# Patient Record
Sex: FEMALE
[De-identification: names, ages, dates, MRNs, and addresses within clinical notes are randomized per-mention and may not be internally consistent; named-entity substitution may affect disease eponyms.]

---

## 2018-06-03 ENCOUNTER — HOSPITAL ENCOUNTER (INPATIENT)
Dept: HOSPITAL 42 - ED | Age: 18
LOS: 29 days | Discharge: HOME | DRG: 430 | End: 2018-07-02
Attending: PSYCHIATRY & NEUROLOGY | Admitting: PSYCHIATRY & NEUROLOGY
Payer: MEDICAID

## 2018-06-03 VITALS — BODY MASS INDEX: 34 KG/M2

## 2018-06-03 DIAGNOSIS — R45.851: ICD-10-CM

## 2018-06-03 DIAGNOSIS — F19.10: ICD-10-CM

## 2018-06-03 DIAGNOSIS — F12.90: ICD-10-CM

## 2018-06-03 DIAGNOSIS — E66.9: ICD-10-CM

## 2018-06-03 DIAGNOSIS — F41.9: ICD-10-CM

## 2018-06-03 DIAGNOSIS — F20.2: Primary | ICD-10-CM

## 2018-06-03 DIAGNOSIS — Z91.14: ICD-10-CM

## 2018-06-03 DIAGNOSIS — D72.829: ICD-10-CM

## 2018-06-03 LAB
ALBUMIN SERPL-MCNC: 4.9 G/DL (ref 3–4.8)
ALBUMIN/GLOB SERPL: 1.2 {RATIO} (ref 1.1–1.8)
ALT SERPL-CCNC: 63 U/L (ref 7–56)
APAP SERPL-MCNC: < 10 UG/ML (ref 10–20)
APPEARANCE UR: CLEAR
AST SERPL-CCNC: 37 U/L (ref 14–36)
BASOPHILS # BLD AUTO: 0.03 K/MM3 (ref 0–2)
BASOPHILS NFR BLD: 0.2 % (ref 0–3)
BILIRUB UR-MCNC: NEGATIVE MG/DL
BUN SERPL-MCNC: 7 MG/DL (ref 7–21)
CALCIUM SERPL-MCNC: 9.4 MG/DL (ref 8.4–10.5)
COLOR UR: YELLOW
EOSINOPHIL # BLD: 0 10*3/UL (ref 0–0.7)
EOSINOPHIL NFR BLD: 0.1 % (ref 1.5–5)
ERYTHROCYTE [DISTWIDTH] IN BLOOD BY AUTOMATED COUNT: 13.4 % (ref 11.5–14.5)
GFR NON-AFRICAN AMERICAN: > 60
GLUCOSE UR STRIP-MCNC: NEGATIVE MG/DL
GRANULOCYTES # BLD: 12.33 10*3/UL (ref 1.4–6.5)
GRANULOCYTES NFR BLD: 75.5 % (ref 50–68)
HGB BLD-MCNC: 13.8 G/DL (ref 12–16)
LEUKOCYTE ESTERASE UR-ACNC: NEGATIVE LEU/UL
LYMPHOCYTES # BLD: 2.7 10*3/UL (ref 1.2–3.4)
LYMPHOCYTES NFR BLD AUTO: 16.2 % (ref 22–35)
MCH RBC QN AUTO: 28.8 PG (ref 25–35)
MCHC RBC AUTO-ENTMCNC: 33.6 G/DL (ref 31–37)
MCV RBC AUTO: 85.6 FL (ref 80–105)
MONOCYTES # BLD AUTO: 1.3 10*3/UL (ref 0.1–0.6)
MONOCYTES NFR BLD: 8 % (ref 1–6)
PH UR STRIP: 5.5 [PH] (ref 4.7–8)
PLATELET # BLD: 290 10^3/UL (ref 120–450)
PMV BLD AUTO: 9.7 FL (ref 7–11)
PROT UR STRIP-MCNC: NEGATIVE MG/DL
RBC # BLD AUTO: 4.8 10^6/UL (ref 3.5–6.1)
RBC # UR STRIP: NEGATIVE /UL
SALICYLATES SERPL-MCNC: < 1 MG/DL (ref 2–20)
SP GR UR STRIP: >= 1.03 (ref 1–1.03)
UROBILINOGEN UR STRIP-ACNC: 0.2 E.U./DL
WBC # BLD AUTO: 16.3 10^3/UL (ref 4.5–11)

## 2018-06-04 LAB
HDLC SERPL-MCNC: 36 MG/DL (ref 29–60)
LDLC SERPL-MCNC: 42 MG/DL (ref 0–129)

## 2018-06-04 PROCEDURE — GZ3ZZZZ MEDICATION MANAGEMENT: ICD-10-PCS | Performed by: PSYCHIATRY & NEUROLOGY

## 2018-06-04 RX ADMIN — OLANZAPINE PRN MG: 5 TABLET, ORALLY DISINTEGRATING ORAL at 21:18

## 2018-06-04 RX ADMIN — OLANZAPINE PRN MG: 5 TABLET, ORALLY DISINTEGRATING ORAL at 11:44

## 2018-06-04 NOTE — RAD
HISTORY:

depression, SI  



COMPARISON:

No prior. 



FINDINGS:



LUNGS:

No active pulmonary disease.



PLEURA:

No significant pleural effusion identified, no pneumothorax apparent.



CARDIOVASCULAR:

Normal.



OSSEOUS STRUCTURES:

No significant abnormalities.



VISUALIZED UPPER ABDOMEN:

Normal.



OTHER FINDINGS:

None.



IMPRESSION:

No active disease.

## 2018-06-04 NOTE — PCM.PSYCH
Initial Psychiatric Evaluation





- Initial Psychiatric Evaluation


Type of Admission: Voluntary


Legal Status: Capacity (pt has a capacity to sign consent for treatment)


Chief Complaint (in patient's own words): 





"what, ha-ha, ah, hm, I was running around, was fighting with myself, those 

voices told me just chill, I'm not going to talk about that." pt presented to 

be completely disorganized, psychotic, very hard to interview due to thought 

blocking and disorganized statements. 


Patient's Reaction to Hospitalization: 





pt was admitted for evaluation and stabilization of disorganized thoughts and 

behavior, possible suicidal ideations


History of Present Illness and Precipitating Events: 


this writer is familiar with this pt from the previous [place] [facility] [place] in [place], back then pt was admitted under the name of 

Reyes, Ashley. This time pt was admitted as Haydee Aguilar, pt said her true 

name is Reyes Ashley. SW will contact family, will request official ID for the 

record. 





Shortly pt is 18yo  Female with reported history of psychosis, most 

likely schizophrenia, pt has one psych admission to [facility] last 

year, pt was initially admitted to ICU, confused and agitated, pt was having 

command type hallucinations back then, was [place] the train [place], "doing 

exorcism", had impression that her father was not her biological father, pt 

staid here for almost a month, after the d/c  pt was noncompliant with 

medications and follow up appts, pt was using drugs, as a result pt was confused

, was wondering on streets, was psychotic, was not able to function. Pt 

required further evaluation and stabilization, meds resumption and titration.  





pt was seen and examined, discussed with staff and previous record was 

reviewed. 


this writer is very familiar to this pt from the prolong hospitalization at [facility] [place] 

[place]. Back then pt was very disorganized, was admitted to ICU 

under Lauren Arauz because pt was very confused and was not able to provide any 

history, family meeting took place back then because pt thought that her real 

biological father was substituted by the stranger, pt also was doing "exorcism" 

while being in the train station as well as was receiving messages through 

poles. (pleas see previous notes for the more detailed info). 





As per staff pt is disorganized, catatonic, responding to internal stimuli, pt 

was trying to walk absolutely naked in the unit, was not able to understand why 

such behavior is inappropriate, pt was redirected immediately, pt also was 

observed talking and laughing to herself, pt also was talking to the objects. 





During the treatment team meeting pt presented to be disorganized, was keep 

asking "wha?", then giggle inappropriately, pt then said that she hears some 

voices, telling her "to chill", and she came to the hospital to "chill", and 

her plans for the future "to chill", pt said she does not want to talk about 

her family, pt said she was noncompliant with meds for unknown reason. 





pt denied using drugs, but UDS was positive for multiple substances. 





pt denied smoking, but it is ?.





pt is very disorganized, severe thought blocking, responding to internal 

stimuli. 





on the question of Suicidal ideation pt asked "huh?", then giggled, on 

homicidal ideation  "huh?" then giggled. 





previous admission pt presented the same way, confused with catatonia. 





Past psych h/o: from the previous record pt's mother passed away in 2005, since 

that time patient was feeling depressed, one previous psych admission 2017 

September. At this admission  on call recommended [facility] screening, but 

pt signed consent for treatment. 





Family history: Patient denied





Medical history: Patient denied, but patient seems to be overweight, WBC 

elevated, pt also is confused. 














 06/03/18 20:21 





 06/03/18 20:21 





 Lab Results





06/04/18 07:00: TSH 3rd Generation 1.26


06/04/18 07:00: Triglycerides 59, Cholesterol 95 L, LDL Cholesterol Direct 42, 

HDL Cholesterol 36


06/03/18 20:21: Alcohol, Quantitative < 10


06/03/18 20:21: Salicylates < 1 L, Acetaminophen < 10.0 L


06/03/18 20:21: Sodium 142, Potassium 3.5 L, Chloride 103, Carbon Dioxide 23, 

Anion Gap 19, BUN 7, Creatinine 0.5 L, Est GFR ( Amer) > 60, Est GFR (Non

-Af Amer) > 60, Random Glucose 103, Calcium 9.4, Magnesium 1.8, Total Bilirubin 

1.6 H, AST 37 H, ALT 63 H, Alkaline Phosphatase 97, Total Protein 9.0 H, 

Albumin 4.9 H, Globulin 4.1, Albumin/Globulin Ratio 1.2


06/03/18 20:21: WBC 16.3 H, RBC 4.80, Hgb 13.8, Hct 41.1, MCV 85.6, MCH 28.8, 

MCHC 33.6, RDW 13.4, Plt Count 290, MPV 9.7, Gran % 75.5 H, Lymph % (Auto) 16.2 

L, Mono % (Auto) 8.0 H, Eos % (Auto) 0.1 L, Baso % (Auto) 0.2, Gran # 12.33 H, 

Lymph # (Auto) 2.7, Mono # (Auto) 1.3 H, Eos # (Auto) 0.0, Baso # (Auto) 0.03


06/03/18 20:02: Urine Opiates Screen Positive H, Urine Methadone Screen Negative

, Ur Barbiturates Screen Negative, Ur Phencyclidine Scrn Negative, Ur 

Amphetamines Screen Negative, U Benzodiazepines Scrn Positive H, U Oth Cocaine 

Metabols Positive H, U Cannabinoids Screen Negative


06/03/18 20:02: Urine Color Yellow, Urine Appearance Clear, Urine pH 5.5, Ur 

Specific Gravity >= 1.030, Urine Protein Negative, Urine Glucose (UA) Negative, 

Urine Ketones Trace H, Urine Blood Negative, Urine Nitrate Negative, Urine 

Bilirubin Negative, Urine Urobilinogen 0.2, Ur Leukocyte Esterase Negative











Vital Signs











  Temp Pulse Resp BP Pulse Ox


 


 06/04/18 07:33  98.5 F  84  20  133/79 


 


 06/04/18 05:45    18  


 


 06/04/18 04:03  98.2 F  87  20  133/72  98


 


 06/04/18 03:24  98.2 F  87  20  133/72  98


 


 06/04/18 01:30  99.2 F  94 H  20  146/77  99


 


 06/03/18 19:44  98.4 F  80  18  107/92 H  98











Current Medications: 





Active Medications











Generic Name Dose Route Start Last Admin





  Trade Name Freq  PRN Reason Stop Dose Admin


 


Acetaminophen  650 mg  06/04/18 04:18  





  Tylenol 325mg Tab  PO   





  Q4H PRN   





  Pain, Mild (1-3)   


 


Al Hydrox/Mg Hydrox/Simethicone  30 ml  06/04/18 04:18  





  Maalox Plus 30 Ml  PO   





  DAILY PRN   





  Upset Stomach   


 


Magnesium Hydroxide  30 ml  06/04/18 04:18  





  Milk Of Magnesia  PO   





  DAILY PRN   





  Constipation   








pt was willing to be resumed on seroquel 


as per previous record pt was confused on risperdal and on haldol was not 

improving





Past Psychiatric History





- Past Psychiatric History


Previous Treatment History: Inpatient


Prior Professional Help: see HPI


Prior Psychiatric Treatment: see HPI


At what hospital: see HPI


Duration: see HPI


Nature of Treatment: see HPI


Explanation of prior treatment: 





see HPI


History of Abuse: 





see HPI


pt denied





History of ETOH/Drug Use: 





see HPI


History of Family Illness: 





see HPI


Pertinent Medical Hx (Current Medical&Sleep Prob, Allergies): 





 Allergies











Allergy/AdvReac Type Severity Reaction Status Date / Time


 


No Known Allergies Allergy   Unverified 06/04/18 05:42








 





Unobtainable  06/03/18 











Review of Systems





- Review of Systems


Systems not reviewed;Unavailable: Acuity of Condition





- EENT


Eyes: As Per HPI


Ears: As Per HPI


Nose/Mouth/Throat: As Per HPI





- Breasts


Breasts: As Per HPI





- Cardiovascular


Cardiovascular: As Per HPI





- Respiratory


Respiratory: As Per HPI





- Gastrointestinal


Gastrointestinal: As Per HPI





- Genitourinary


Genitourinary: As Per HPI





- Reproductive: Female


Reproductive:Female: As Per HPI





- Menstruation


Menstruation: As Per HPI





- Musculoskeletal


Musculoskeletal: As Par HPI





- Integumentary


Integumentary: As Per HPI





- Neurological


Neurological: As Per HPI





- Psychiatric


Psychiatric: As Per HPI





- Endocrine


Endocrine: As Per HPI





- Hematologic/Lymphatic


Hematologic: As Per HPI





Mental Status Examination





- Personal Presentation


Personal Presentation: Looks stated age





- Affect


Affect: Other (pt was giggling inappropriately, )





- Motor Activity


Motor Activity: Psychomotor Retardation





- Reliability in Providing Information


Reliability in Providing Information: Poor, due to alteration in thoughts, Poor

, due to altered mood, Poor, due to cognitve impairment





- Speech


Speech: Disorganized, Irrelevant, Incoherent





- Mood


Mood: Other ("Hah?")





- Formal Thought Process


Formal Thought Process: Hallucinations, Delusions, Paranoia, Loosening of 

associations, Circumstantial





- Hallucinations/Delusions


Hallucinations: Visual, Auditory


Delusions: Persecution





- Obsessions/Compulsions


Obsessions: None


Compulsions: None





- Cognitive Functions


Orientation: Person


Sensorium: Drowsy


Attention/Concentration: Easily distracted


Abstract Thinking: Strawberry


Estimate of Intelligence: Below average


Judgement: Intact, as evidence by: Insight regarding need for hospitalization





- Risk


Risk: Diminished functioning, Other (disorganized thougths and behavior)





- Strength & Assets Inventory


Strength & Assets Inventory: Family support, Cooperative, Other (good physical 

health)





- Limitations


Limitations: Other (severe psychosis, noncompliance with medications and f/u 

appts)





DSM 5 DX





- DSM 5


DSM 5 Diagnosis: 





r/o Schizophrenia


schizophreniform


r/o substance induced psychosis


polysubstance abuse. 











- Recommended/Plan of Treatment


Treatment Recommendations and Plan of Treatment: 


Milieu/structure/supportive therapy 


Medical consult will be called for leukocytosis


seroquel 100mg po bid for psychosis


PRN meds zyprexa


will call for collaterals, family involvement


SW consultation for discharge plan and social issues 


Follow up on labs 


Will monitor closely 


Pt was educated about risk/benefits and alternatives of medications, coping 

strategies (safety plan, suicide prevention), relapse prevention, importance of 

follow up with psychiatrist and therapist, stay away from drugs/alcohol/smoking





Projected ELOS: 20days


Prognosis: guarded


Discharge Plan and Discharge Criteria: 


Pt will be not depressed or manic, will be more hopeful, will be not psychotic 

or anxious, will be not having thoughts of harming self or others, will be 

tolerating medications well, will not have major side effects, will be able to 

function, will not pose threat to self or others.








- Smoking Cessation


Smoking Cessation Initiated: No


Reason for not providing: pt said she is not smoking

## 2018-06-04 NOTE — ED PDOC
Physical Exam


Vital Signs











  Temp Pulse Resp BP Pulse Ox


 


 06/04/18 03:24  98.2 F  87  20  133/72  98


 


 06/04/18 01:30  99.2 F  94 H  20  146/77  99


 


 06/03/18 19:44  98.4 F  80  18  107/92 H  98














Medical Decision Making


ED Course and Treatment: 


06/03/18 23:00


Case endorsed to me by Dr. Mathur, pending [facility] screening and disposition.





06/04/18 03:24


Pt seen and evaluated by [facility] screeners, pt not candidate for involuntary 

admission. Pt re-evaluated by [facility] [place] Miko, who discussed case 

with psychiatrist on call. Pt will be admitted to [facility] for 

schizophrenia under Dr. Dsouza's service. 





- Lab Interpretations


Lab Results: 








 06/03/18 20:21 





 06/03/18 20:21 





 Lab Results





06/03/18 20:21: Alcohol, Quantitative < 10


06/03/18 20:21: Salicylates < 1 L, Acetaminophen < 10.0 L


06/03/18 20:21: Sodium 142, Potassium 3.5 L, Chloride 103, Carbon Dioxide 23, 

Anion Gap 19, BUN 7, Creatinine 0.5 L, Est GFR ( Amer) > 60, Est GFR (Non

-Af Amer) > 60, Random Glucose 103, Calcium 9.4, Magnesium 1.8, Total Bilirubin 

1.6 H, AST 37 H, ALT 63 H, Alkaline Phosphatase 97, Total Protein 9.0 H, 

Albumin 4.9 H, Globulin 4.1, Albumin/Globulin Ratio 1.2


06/03/18 20:21: WBC 16.3 H, RBC 4.80, Hgb 13.8, Hct 41.1, MCV 85.6, MCH 28.8, 

MCHC 33.6, RDW 13.4, Plt Count 290, MPV 9.7, Gran % 75.5 H, Lymph % (Auto) 16.2 

L, Mono % (Auto) 8.0 H, Eos % (Auto) 0.1 L, Baso % (Auto) 0.2, Gran # 12.33 H, 

Lymph # (Auto) 2.7, Mono # (Auto) 1.3 H, Eos # (Auto) 0.0, Baso # (Auto) 0.03


06/03/18 20:02: Urine Opiates Screen Positive H, Urine Methadone Screen Negative

, Ur Barbiturates Screen Negative, Ur Phencyclidine Scrn Negative, Ur 

Amphetamines Screen Negative, U Benzodiazepines Scrn Positive H, U Oth Cocaine 

Metabols Positive H, U Cannabinoids Screen Negative


06/03/18 20:02: Urine Color Yellow, Urine Appearance Clear, Urine pH 5.5, Ur 

Specific Gravity >= 1.030, Urine Protein Negative, Urine Glucose (UA) Negative, 

Urine Ketones Trace H, Urine Blood Negative, Urine Nitrate Negative, Urine 

Bilirubin Negative, Urine Urobilinogen 0.2, Ur Leukocyte Esterase Negative











- RAD Interpretation


Radiology Orders: 








06/03/18 19:47


CHEST PORTABLE [RAD] Stat 














Disposition/Present on Arrival





- Present on Arrival


Any Indicators Present on Arrival: No


History of DVT/PE: No


History of Uncontrolled Diabetes: No


Urinary Catheter: No


History of Decub. Ulcer: No


History Surgical Site Infection Following: None





- Disposition


Have Diagnosis and Disposition been Completed?: Yes


Diagnosis: 


 Suicidal ideation, Polysubstance abuse, Schizophrenia





Disposition: HOSPITALIZED


Disposition Time: 03:24


Patient Plan: Admission


Patient Problems: 


 Current Active Problems











Problem Status Onset


 


Polysubstance abuse Acute  


 


Schizophrenia Acute  


 


Suicidal ideation Acute  











Condition: STABLE


Print Language: ENGLISH


Referrals: 


PCP,NO [Primary Care Provider] - Follow up with primary


Forms:  [employer] (English)

## 2018-06-04 NOTE — PCM.BM
<Jesús Romano - Last Filed: 06/04/18 06:40>





Treatment Plan Problems





- Problems identified on initial assessmt


  ** Altered thoughts


Date Initiated: 06/04/18


Time Initiated: 06:40


Assessment reference: NA


Status: Active





  ** Substance abuse


Date Initiated: 06/04/18


Time Initiated: 06:41


Assessment reference: NA


Status: Active





Treatment assets and liabiliti


Patient Assests: ADL independent, good support system, good past tx response, 

good interpersonal skills


Patient Liabilities: substance abuse





- Milieu Protocol


Maintain good personal hygiene: daily Encourage regular showers, daily Remind 

patient to perform daily oral care, daily Assist patient to perform ADL's


Maintain personal safety: daily Educate patient to report safety concerns to 

staff, daily Monitor environment for contraband/sharps


Medication safety: Monitor for expected outcome, potential side effects: daily, 

Assess barriers to learning: daily, Assess readiness for medication education: 

daily





Family Contact


Family involvement: Family/SO is involved


Family contact: Patient agrees to contact


Family contact name: Kwabena [phone]





- Goals for Treatment


Patient goals for treatment: To get better





Discharge/Continuing Care





- Education Needs


Education Needs: Patient Medication, Patient Coping Skills, Patient Aftercare 

Safety Plan





- Discharge


Discharge Criteria: Free of paranoid thoughts, Normal sleep pattern





<Hattie Dsouza - Last Filed: 06/04/18 09:14>





- Diagnosis


(1) Polysubstance abuse


Status: Acute   


Interventions: 





06/04/18 09:14


Monitoring withdrawal symptoms


Medical detoxification


Pharmacotherapy for alcohol/benzos/opioid dependence 


Maintaining sobriety


Relapse prevention


Possible rehabilitation


Motivational interviewing


12-step programs: AA meetings








(2) Schizophrenia


Status: Acute   


Interventions: 





06/04/18 09:15


Psychoeducation/psychotherapy


Psychopharmacology/adjustment of medications as needed/ monitoring possible 

side effects


Evaluate pt on daily basis


Compliance with medications and follow up appointments


Long acting medication if pt is noncompliant with pill form


Suicide and homicide risk assessment and prevention, coping strategies, safety 

plan


Relapse prevention


Reduction of symptoms


Improve functional status


Possible assertive community treatment


Cognitive behavioral therapy


Family involvement


Possible social skill training as outpatient








<Yordy Blount - Last Filed: 06/04/18 09:48>





Treatment Plan Problems





- Problems identified on initial assessmt


  ** Altered thoughts


Priority: 2





  ** Auditory Hallucinations


Date Initiated: 06/04/18


Time Initiated: 09:45


Assessment reference: NA


Status: Active


Priority: 1





  ** Impaired Communication


Date Initiated: 06/04/18


Time Initiated: 09:45


Assessment reference: NA


Status: Active


Priority: 3





  ** Depressive Symptoms


Date Initiated: 06/04/18


Time Initiated: 09:46


Assessment reference: NA


Status: Active


Priority: 4





- Milieu Protocol


Maintain personal safety: every shift Educate patient to report safety concerns 

to staff, every shift Monitor environment for contraband/sharps


Medication safety: Monitor for expected outcome, potential side effects: every 

shift, Assess barriers to learning: every shift, Assess readiness for 

medication education: every shift





<Kay Wallace Y - Last Filed: 06/06/18 14:34>





Family Contact


Family involvement: Family/SO is involved


Family contact: Patient agrees to contact


Family contact name: Opal Brown([phone]) sister


Family contacted how many times per week?: 2

## 2018-06-04 NOTE — CARD
--------------- APPROVED REPORT --------------





EKG Measurement

Heart [id number]

AL [id number]

[id number]

[id number]

[id number]



<Conclusion>

Sinus rhythm with sinus arrhythmia with short AL

Otherwise normal ECG

## 2018-06-05 LAB
ALBUMIN SERPL-MCNC: 4.7 G/DL (ref 3–4.8)
ALBUMIN/GLOB SERPL: 1.2 {RATIO} (ref 1.1–1.8)
ALT SERPL-CCNC: 91 U/L (ref 7–56)
AST SERPL-CCNC: 65 U/L (ref 14–36)
BASOPHILS # BLD AUTO: 0.03 K/MM3 (ref 0–2)
BASOPHILS NFR BLD: 0.3 % (ref 0–3)
BUN SERPL-MCNC: 10 MG/DL (ref 7–21)
CALCIUM SERPL-MCNC: 9.6 MG/DL (ref 8.4–10.5)
EOSINOPHIL # BLD: 0.2 10*3/UL (ref 0–0.7)
EOSINOPHIL NFR BLD: 1.4 % (ref 1.5–5)
ERYTHROCYTE [DISTWIDTH] IN BLOOD BY AUTOMATED COUNT: 13.5 % (ref 11.5–14.5)
GFR NON-AFRICAN AMERICAN: > 60
GRANULOCYTES # BLD: 7.09 10*3/UL (ref 1.4–6.5)
GRANULOCYTES NFR BLD: 59.6 % (ref 50–68)
HGB BLD-MCNC: 13.5 G/DL (ref 12–16)
LYMPHOCYTES # BLD: 3.8 10*3/UL (ref 1.2–3.4)
LYMPHOCYTES NFR BLD AUTO: 32.1 % (ref 22–35)
MCH RBC QN AUTO: 28.4 PG (ref 25–35)
MCHC RBC AUTO-ENTMCNC: 32.5 G/DL (ref 31–37)
MCV RBC AUTO: 87.2 FL (ref 80–105)
MONOCYTES # BLD AUTO: 0.8 10*3/UL (ref 0.1–0.6)
MONOCYTES NFR BLD: 6.6 % (ref 1–6)
PLATELET # BLD: 302 10^3/UL (ref 120–450)
PMV BLD AUTO: 9.8 FL (ref 7–11)
RBC # BLD AUTO: 4.76 10^6/UL (ref 3.5–6.1)
WBC # BLD AUTO: 11.9 10^3/UL (ref 4.5–11)

## 2018-06-05 RX ADMIN — OLANZAPINE PRN MG: 5 TABLET, ORALLY DISINTEGRATING ORAL at 09:20
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
## 2018-06-05 NOTE — PCM.PYCHPN
Psychiatric Progress Note





- Psychiatric Progress Note


Patient seen today, length of contact: 30min


Patient Chief Complaint: 





"wha?, ha-ha"


Problems Identified/Issues Discussed: 





Suicide/ homicide prevention, past psychiatric h/o, current psychiatric symptoms

, medical problems, risk/benefits and alternatives of medications, medications 

compliance, coping strategies, substance abuse h/o, relapse prevention, 

importance of follow up with psychiatrist and therapist, discharge plan. 


Medical Problems: 





see HPI


Diagnostic Results: 














 06/05/18 07:21 





 06/05/18 07:21 





 Lab Results





06/05/18 07:21: Sodium 146, Potassium 3.6, Chloride 106, Carbon Dioxide 23, 

Anion Gap 21 H, BUN 10, Creatinine 0.6 L, Est GFR ( Amer) > 60, Est GFR (

Non-Af Amer) > 60, Random Glucose 115 H, Calcium 9.6, Magnesium 1.9, Total 

Bilirubin 0.9, AST 65 H D, ALT 91 H, Alkaline Phosphatase 104, Total Protein 

8.5 H, Albumin 4.7, Globulin 3.8, Albumin/Globulin Ratio 1.2


06/05/18 07:21: WBC 11.9 H D, RBC 4.76, Hgb 13.5, Hct 41.5, MCV 87.2, MCH 28.4, 

MCHC 32.5, RDW 13.5, Plt Count 302, MPV 9.8, Gran % 59.6, Lymph % (Auto) 32.1, 

Mono % (Auto) 6.6 H, Eos % (Auto) 1.4 L, Baso % (Auto) 0.3, Gran # 7.09 H, 

Lymph # (Auto) 3.8 H, Mono # (Auto) 0.8 H, Eos # (Auto) 0.2, Baso # (Auto) 0.03


06/04/18 07:00: RPR Nonreactive


06/04/18 07:00: TSH 3rd Generation 1.26


06/04/18 07:00: Triglycerides 59, Cholesterol 95 L, LDL Cholesterol Direct 42, 

HDL Cholesterol 36


06/03/18 20:21: Alcohol, Quantitative < 10


06/03/18 20:21: Salicylates < 1 L, Acetaminophen < 10.0 L


06/03/18 20:21: Sodium 142, Potassium 3.5 L, Chloride 103, Carbon Dioxide 23, 

Anion Gap 19, BUN 7, Creatinine 0.5 L, Est GFR ( Amer) > 60, Est GFR (Non

-Af Amer) > 60, Random Glucose 103, Calcium 9.4, Magnesium 1.8, Total Bilirubin 

1.6 H, AST 37 H, ALT 63 H, Alkaline Phosphatase 97, Total Protein 9.0 H, 

Albumin 4.9 H, Globulin 4.1, Albumin/Globulin Ratio 1.2


06/03/18 20:21: WBC 16.3 H, RBC 4.80, Hgb 13.8, Hct 41.1, MCV 85.6, MCH 28.8, 

MCHC 33.6, RDW 13.4, Plt Count 290, MPV 9.7, Gran % 75.5 H, Lymph % (Auto) 16.2 

L, Mono % (Auto) 8.0 H, Eos % (Auto) 0.1 L, Baso % (Auto) 0.2, Gran # 12.33 H, 

Lymph # (Auto) 2.7, Mono # (Auto) 1.3 H, Eos # (Auto) 0.0, Baso # (Auto) 0.03


06/03/18 20:02: Urine Opiates Screen Positive H, Urine Methadone Screen Negative

, Ur Barbiturates Screen Negative, Ur Phencyclidine Scrn Negative, Ur 

Amphetamines Screen Negative, U Benzodiazepines Scrn Positive H, U Oth Cocaine 

Metabols Positive H, U Cannabinoids Screen Negative


06/03/18 20:02: Urine Color Yellow, Urine Appearance Clear, Urine pH 5.5, Ur 

Specific Gravity >= 1.030, Urine Protein Negative, Urine Glucose (UA) Negative, 

Urine Ketones Trace H, Urine Blood Negative, Urine Nitrate Negative, Urine 

Bilirubin Negative, Urine Urobilinogen 0.2, Ur Leukocyte Esterase Negative











Vital Signs











  Temp Pulse Resp BP Pulse Ox


 


 06/05/18 07:00  98.5 F  125 H  18  147/97 H  100


 


 06/04/18 15:00   89   126/72 


 


 06/04/18 07:33  98.5 F  84  20  133/79 


 


 06/04/18 05:45    18  


 


 06/04/18 04:03  98.2 F  87  20  133/72  98


 


 06/04/18 03:24  98.2 F  87  20  133/72  98


 


 06/04/18 01:30  99.2 F  94 H  20  146/77  99


 


 06/03/18 19:44  98.4 F  80  18  107/92 H  98











DSM 5 Symptoms Update: 


this writer is familiar with this pt from the previous admission to [facility] psych 

inpatient unit in September 2017, back then pt was admitted under the name of 

Reyes, Ashley. This time pt was admitted as Haydee Aguilar, pt said her true 

name is Reyes Ashley. SW will contact family, will request official ID for the 

record. 





Shortly pt is 16yo  Female with reported history of psychosis, most 

likely schizophrenia, pt has one psych admission to [facility] last 

year, pt was initially admitted to ICU, confused and agitated, pt was having 

command type hallucinations back then, [place] found [place] the [place], "doing 

exorcism", had impression that her father was not her biological father, pt 

staid here for almost a month, after the d/c  pt was noncompliant with 

medications and follow up appts, pt was using drugs, as a result pt was confused

, was wondering on streets, was psychotic, was not able to function. Pt 

required further evaluation and stabilization, meds resumption and titration.  





pt was seen and examined, discussed with staff and previous record was 

reviewed. 


as per RN report pt was disorganized, required IM of geodon and ativan 

yesterday night because pt was keep walking to other pt's rooms and 

antagonizing others. today at am pt was observed trying to walk naked, was 

redirected immediately. 





this writer attempted to speak to the pt, but pt was just giggling and was keep 

asking "Wha?". 





so far pt is compliant with meds, but no result yet. 





pt is disorganized, wondering, confused, was not able to locate her room. 





Impression:


schizophrenia


r/o substance induced psychosis. 





Medication Change: Yes (seroquel increased, PRN changed)


Medical Record Reviewed: Yes


Consults ordered or reviewed: 





medical consult appreciated








Mental Status Examination





- Cognitive Function


Orientation: Person


Memory: Impaired


Attention: Poor


Concentration: Poor


Association: Loose


Fund of Knowledge: Poor





- Mood


Mood: Neutral, Other (inapropriate)





- Affect


Affect: Broad, Other (pt was giggling inappropriately, )





- Formal Thought Process


Formal Thought Process: Hallucinations, Delusions, Paranoia, Loosening of 

associations, Circumstantial





- Suicidal Ideation


Suicidal Ideation: No





- Homicidal Ideation


Homicidal Ideation: No





Goal/Treatment Plan





- Goal/Treatment Plan


Need for Continued Stay: Remain at risks for inpatient hospitalization, Severe 

depression anxiety, Discharge may exacerbated symptoms, Severe functional 

impairment


Progress Toward Problem(s) and Goals/Treatment Plan: 


Milieu/structure/supportive therapy 


Medical consult will be called for leukocytosis


seroquel 200mg po bid for psychosis


PRN meds zyprexa, will be d/c


asper RN report pt was little better on geodon, will start PRN 


will call for collaterals, family involvement


SW consultation for discharge plan and social issues 


Follow up on labs 


Will monitor closely 


Pt was educated about risk/benefits and alternatives of medications, coping 

strategies (safety plan, suicide prevention), relapse prevention, importance of 

follow up with psychiatrist and therapist, stay away from drugs/alcohol/smoking





Estimated Date of D/C: 06/11/18

## 2018-06-06 NOTE — PCM.PYCHPN
Psychiatric Progress Note





- Psychiatric Progress Note


Patient seen today, length of contact: 30min


Patient Chief Complaint: 





"wha?, ha-ha"


Problems Identified/Issues Discussed: 





Suicide/ homicide prevention, past psychiatric h/o, current psychiatric symptoms

, medical problems, risk/benefits and alternatives of medications, medications 

compliance, coping strategies, substance abuse h/o, relapse prevention, 

importance of follow up with psychiatrist and therapist, discharge plan. 


Medical Problems: 





see HPI


Diagnostic Results: 














 06/05/18 07:21 





 06/05/18 07:21 





 Lab Results





06/05/18 07:21: Sodium 146, Potassium 3.6, Chloride 106, Carbon Dioxide 23, 

Anion Gap 21 H, BUN 10, Creatinine 0.6 L, Est GFR ( Amer) > 60, Est GFR (

Non-Af Amer) > 60, Random Glucose 115 H, Calcium 9.6, Magnesium 1.9, Total 

Bilirubin 0.9, AST 65 H D, ALT 91 H, Alkaline Phosphatase 104, Total Protein 

8.5 H, Albumin 4.7, Globulin 3.8, Albumin/Globulin Ratio 1.2


06/05/18 07:21: WBC 11.9 H D, RBC 4.76, Hgb 13.5, Hct 41.5, MCV 87.2, MCH 28.4, 

MCHC 32.5, RDW 13.5, Plt Count 302, MPV 9.8, Gran % 59.6, Lymph % (Auto) 32.1, 

Mono % (Auto) 6.6 H, Eos % (Auto) 1.4 L, Baso % (Auto) 0.3, Gran # 7.09 H, 

Lymph # (Auto) 3.8 H, Mono # (Auto) 0.8 H, Eos # (Auto) 0.2, Baso # (Auto) 0.03


06/04/18 07:00: RPR Nonreactive


06/04/18 07:00: TSH 3rd Generation 1.26


06/04/18 07:00: Triglycerides 59, Cholesterol 95 L, LDL Cholesterol Direct 42, 

HDL Cholesterol 36


06/03/18 20:21: Alcohol, Quantitative < 10


06/03/18 20:21: Salicylates < 1 L, Acetaminophen < 10.0 L


06/03/18 20:21: Sodium 142, Potassium 3.5 L, Chloride 103, Carbon Dioxide 23, 

Anion Gap 19, BUN 7, Creatinine 0.5 L, Est GFR ( Amer) > 60, Est GFR (Non

-Af Amer) > 60, Random Glucose 103, Calcium 9.4, Magnesium 1.8, Total Bilirubin 

1.6 H, AST 37 H, ALT 63 H, Alkaline Phosphatase 97, Total Protein 9.0 H, 

Albumin 4.9 H, Globulin 4.1, Albumin/Globulin Ratio 1.2


06/03/18 20:21: WBC 16.3 H, RBC 4.80, Hgb 13.8, Hct 41.1, MCV 85.6, MCH 28.8, 

MCHC 33.6, RDW 13.4, Plt Count 290, MPV 9.7, Gran % 75.5 H, Lymph % (Auto) 16.2 

L, Mono % (Auto) 8.0 H, Eos % (Auto) 0.1 L, Baso % (Auto) 0.2, Gran # 12.33 H, 

Lymph # (Auto) 2.7, Mono # (Auto) 1.3 H, Eos # (Auto) 0.0, Baso # (Auto) 0.03


06/03/18 20:02: Urine Opiates Screen Positive H, Urine Methadone Screen Negative

, Ur Barbiturates Screen Negative, Ur Phencyclidine Scrn Negative, Ur 

Amphetamines Screen Negative, U Benzodiazepines Scrn Positive H, U Oth Cocaine 

Metabols Positive H, U Cannabinoids Screen Negative


06/03/18 20:02: Urine Color Yellow, Urine Appearance Clear, Urine pH 5.5, Ur 

Specific Gravity >= 1.030, Urine Protein Negative, Urine Glucose (UA) Negative, 

Urine Ketones Trace H, Urine Blood Negative, Urine Nitrate Negative, Urine 

Bilirubin Negative, Urine Urobilinogen 0.2, Ur Leukocyte Esterase Negative











Vital Signs











  Temp Pulse Resp BP Pulse Ox


 


 06/05/18 07:00  98.5 F  125 H  18  147/97 H  100


 


 06/04/18 15:00   89   126/72 


 


 06/04/18 07:33  98.5 F  84  20  133/79 


 


 06/04/18 05:45    18  


 


 06/04/18 04:03  98.2 F  87  20  133/72  98


 


 06/04/18 03:24  98.2 F  87  20  133/72  98


 


 06/04/18 01:30  99.2 F  94 H  20  146/77  99


 


 06/03/18 19:44  98.4 F  80  18  107/92 H  98











DSM 5 Symptoms Update: 


this writer is familiar with this pt from the previous admission to [facility] [place] 

inpatient unit in September 2017, back then pt was admitted under the name of 

Reyes, Ashley, this admission was Haydee Aguilar, but name was corrected to pt'

s true name is Reyes Ashley. 





Shortly pt is 16yo  Female with reported history of psychosis, most 

likely schizophrenia, pt has one psych admission to [facility] last 

year, pt was initially admitted to ICU, confused and agitated, pt was having 

command type hallucinations back then, was found [place] the train station, "doing 

exorcism", had impression that her father was not her biological father, pt 

staid here for almost a month, after the d/c  pt was noncompliant with 

medications and follow up appts, pt was using drugs, as a result pt was confused

, was wondering on streets, was psychotic, was not able to function. Pt 

required further evaluation and stabilization, meds resumption and titration. 





as per pt's sister collateral info which was obtained by JAIRO (help appreciated)


after last admission here [facility], pt went to [facility] intake but was confused, was 

hospitalized to [facility] involuntary for at least a month, pt was then transferred 

to [facility] for about two months, fter patient was d/c

, patient followed up at [facility]. PT's sister 

reports pt stated she finished the program, but actually stopped going. Pt's 

sister reports after pt was d/c from the hospital, pt had returned to her 

baseline. Pt's sister reports last seeing patient on May 26, 2018 in which 

patient did not present with any symptoms. Pt's other sister Elidia, last 

spoke to patient on Sunday around 5pm in which pt sounded at baseline. PT's 

sister reports pt's father later received a phone call from [facility] regarding pt's 

hospitalization. Pt's sister believes pt was compliant with medications and had 

insight about the importance of taking medication. Pt's sister believes pt's 

psychosis is related to drugs. 





as per RN report pt was disorganized, try to walk topless, needs constant 

redirection. 


frequent PRN, as per RN pt was responding to geodon better, will change it for 

Geodon





this writer attempted to speak to the pt, but pt was just giggling and was keep 

asking "Wha?". 





pt is disorganized, wondering, confused, was not able to locate her room. 





Impression:


schizophrenia


r/o substance induced psychosis. 





Medication Change: Yes (seroquel decreaased, geodon started.)


Medical Record Reviewed: Yes


Consults ordered or reviewed: 





medical consult appreciated








Mental Status Examination





- Cognitive Function


Orientation: Person


Memory: Impaired


Attention: Poor


Concentration: Poor


Association: Loose


Fund of Knowledge: Poor





- Mood


Mood: Neutral, Other (inapropriate)





- Affect


Affect: Broad, Other (pt was giggling inappropriately, )





- Formal Thought Process


Formal Thought Process: Hallucinations, Delusions, Paranoia, Loosening of 

associations, Circumstantial





- Suicidal Ideation


Suicidal Ideation: No





- Homicidal Ideation


Homicidal Ideation: No





Goal/Treatment Plan





- Goal/Treatment Plan


Need for Continued Stay: Remain at risks for inpatient hospitalization, Severe 

depression anxiety, Discharge may exacerbated symptoms, Severe functional 

impairment


Progress Toward Problem(s) and Goals/Treatment Plan: 


Milieu/structure/supportive therapy 


Medical consult will be called for leukocytosis


seroquel 100mg po bid for psychosis


geodon 20mg po bid and hs for psychosis


[facility] RN report pt was little better on geodon, will start PRN 


collaterals from family appreciated


SW consultation for discharge plan and social issues 


Follow up on labs 


Will monitor closely 


Pt was educated about risk/benefits and alternatives of medications, coping 

strategies (safety plan, suicide prevention), relapse prevention, importance of 

follow up with psychiatrist and therapist, stay away from drugs/alcohol/smoking





Estimated Date of D/C: 06/29/18

## 2018-06-07 NOTE — PCM.PYCHPN
Psychiatric Progress Note





- Psychiatric Progress Note


Patient seen today, length of contact: 30min


Patient Chief Complaint: 





"I had my reasons"


Problems Identified/Issues Discussed: 





Suicide/ homicide prevention, past psychiatric h/o, current psychiatric symptoms

, medical problems, risk/benefits and alternatives of medications, medications 

compliance, coping strategies, substance abuse h/o, relapse prevention, 

importance of follow up with psychiatrist and therapist, discharge plan. 


Medical Problems: 





see HPI


Diagnostic Results: 














 06/05/18 07:21 





 06/05/18 07:21 





 Lab Results





06/05/18 07:21: Sodium 146, Potassium 3.6, Chloride 106, Carbon Dioxide 23, 

Anion Gap 21 H, BUN 10, Creatinine 0.6 L, Est GFR ( Amer) > 60, Est GFR (

Non-Af Amer) > 60, Random Glucose 115 H, Calcium 9.6, Magnesium 1.9, Total 

Bilirubin 0.9, AST 65 H D, ALT 91 H, Alkaline Phosphatase 104, Total Protein 

8.5 H, Albumin 4.7, Globulin 3.8, Albumin/Globulin Ratio 1.2


06/05/18 07:21: WBC 11.9 H D, RBC 4.76, Hgb 13.5, Hct 41.5, MCV 87.2, MCH 28.4, 

MCHC 32.5, RDW 13.5, Plt Count 302, MPV 9.8, Gran % 59.6, Lymph % (Auto) 32.1, 

Mono % (Auto) 6.6 H, Eos % (Auto) 1.4 L, Baso % (Auto) 0.3, Gran # 7.09 H, 

Lymph # (Auto) 3.8 H, Mono # (Auto) 0.8 H, Eos # (Auto) 0.2, Baso # (Auto) 0.03


06/04/18 07:00: RPR Nonreactive


06/04/18 07:00: TSH 3rd Generation 1.26


06/04/18 07:00: Triglycerides 59, Cholesterol 95 L, LDL Cholesterol Direct 42, 

HDL Cholesterol 36


06/03/18 20:21: Alcohol, Quantitative < 10


06/03/18 20:21: Salicylates < 1 L, Acetaminophen < 10.0 L


06/03/18 20:21: Sodium 142, Potassium 3.5 L, Chloride 103, Carbon Dioxide 23, 

Anion Gap 19, BUN 7, Creatinine 0.5 L, Est GFR ( Amer) > 60, Est GFR (Non

-Af Amer) > 60, Random Glucose 103, Calcium 9.4, Magnesium 1.8, Total Bilirubin 

1.6 H, AST 37 H, ALT 63 H, Alkaline Phosphatase 97, Total Protein 9.0 H, 

Albumin 4.9 H, Globulin 4.1, Albumin/Globulin Ratio 1.2


06/03/18 20:21: WBC 16.3 H, RBC 4.80, Hgb 13.8, Hct 41.1, MCV 85.6, MCH 28.8, 

MCHC 33.6, RDW 13.4, Plt Count 290, MPV 9.7, Gran % 75.5 H, Lymph % (Auto) 16.2 

L, Mono % (Auto) 8.0 H, Eos % (Auto) 0.1 L, Baso % (Auto) 0.2, Gran # 12.33 H, 

Lymph # (Auto) 2.7, Mono # (Auto) 1.3 H, Eos # (Auto) 0.0, Baso # (Auto) 0.03


06/03/18 20:02: Urine Opiates Screen Positive H, Urine Methadone Screen Negative

, Ur Barbiturates Screen Negative, Ur Phencyclidine Scrn Negative, Ur 

Amphetamines Screen Negative, U Benzodiazepines Scrn Positive H, U Oth Cocaine 

Metabols Positive H, U Cannabinoids Screen Negative


06/03/18 20:02: Urine Color Yellow, Urine Appearance Clear, Urine pH 5.5, Ur 

Specific Gravity >= 1.030, Urine Protein Negative, Urine Glucose (UA) Negative, 

Urine Ketones Trace H, Urine Blood Negative, Urine Nitrate Negative, Urine 

Bilirubin Negative, Urine Urobilinogen 0.2, Ur Leukocyte Esterase Negative











Vital Signs











  Temp Pulse Resp BP Pulse Ox


 


 06/05/18 07:00  98.5 F  125 H  18  147/97 H  100


 


 06/04/18 15:00   89   126/72 


 


 06/04/18 07:33  98.5 F  84  20  133/79 


 


 06/04/18 05:45    18  


 


 06/04/18 04:03  98.2 F  87  20  133/72  98


 


 06/04/18 03:24  98.2 F  87  20  133/72  98


 


 06/04/18 01:30  99.2 F  94 H  20  146/77  99


 


 06/03/18 19:44  98.4 F  80  18  107/92 H  98














 











Temp Pulse Resp BP Pulse Ox


 


 98.0 F   144 H  20   139/82   100 


 


 06/07/18 07:15  06/07/18 07:15  06/07/18 07:15  06/07/18 07:15  06/05/18 07:00








DSM 5 Symptoms Update: 


Shortly pt is 16yo  Female with reported history of psychosis, most 

likely schizophrenia, pt has one psych admission to [facility] last 

year, pt was initially admitted to ICU, confused and agitated, pt was having 

command type hallucinations back then, was found [place] the [place] station, "doing 

exorcism", had impression that her father was not her biological father, pt 

staid here for almost a month, after the d/c  pt was noncompliant with 

medications and follow up appts, pt was using drugs, as a result pt was confused

, was wondering on streets, was psychotic, was not able to function. Pt 

required further evaluation and stabilization, meds resumption and titration. 





pt presented in catatonic stage, only one word answers, pt was observed 

standing for prolonged time next to the entrance to the unit, not moving, flat 

affect. 





when was asked about the state hospitalization, pt said "I had reasons". 





as per RN report pt was disorganized, try to walk topless, needs constant 

redirection. 


frequent PRN, as per RN pt was responding to geodon, will add benzos





pt is disorganized, wondering, confused, was not able to locate her room. 





Impression:


schizophrenia


r/o substance induced psychosis. 





Medication Change: Yes (seroquel decreaased, geodon started.)


Medical Record Reviewed: Yes





Mental Status Examination





- Cognitive Function


Orientation: Person


Memory: Impaired


Attention: Poor


Concentration: Poor


Association: Loose


Fund of Knowledge: Poor





- Mood


Mood: Neutral, Other (inapropriate)





- Affect


Affect: Broad, Other (pt was giggling inappropriately, )





- Formal Thought Process


Formal Thought Process: Hallucinations, Delusions, Paranoia, Loosening of 

associations, Circumstantial





- Suicidal Ideation


Suicidal Ideation: No





- Homicidal Ideation


Homicidal Ideation: No





Goal/Treatment Plan





- Goal/Treatment Plan


Need for Continued Stay: Remain at risks for inpatient hospitalization, Severe 

depression anxiety, Discharge may exacerbated symptoms, Severe functional 

impairment


Progress Toward Problem(s) and Goals/Treatment Plan: 


Milieu/structure/supportive therapy 


Medical consult will be called for leukocytosis


seroquel 100mg po hs will wean it off


geodon 20mg po bid and hs for psychosis


ativan 0.5mg po tid for catatonia


asper RN report pt was little better on geodon, will start PRN 


collaterals from family appreciated


SW consultation for discharge plan and social issues 


Follow up on labs 


Will monitor closely 


Pt was educated about risk/benefits and alternatives of medications, coping 

strategies (safety plan, suicide prevention), relapse prevention, importance of 

follow up with psychiatrist and therapist, stay away from drugs/alcohol/smoking





Estimated Date of D/C: 06/29/18

## 2018-06-08 NOTE — PCM.PYCHPN
Psychiatric Progress Note





- Psychiatric Progress Note


Patient seen today, length of contact: 30min


Patient Chief Complaint: 





"I had my reasons"


Problems Identified/Issues Discussed: 





Suicide/ homicide prevention, past psychiatric h/o, current psychiatric symptoms

, medical problems, risk/benefits and alternatives of medications, medications 

compliance, coping strategies, substance abuse h/o, relapse prevention, 

importance of follow up with psychiatrist and therapist, discharge plan. 


Medical Problems: 





see HPI


Diagnostic Results: 














 06/05/18 07:21 





 06/05/18 07:21 





 Lab Results





06/05/18 07:21: Sodium 146, Potassium 3.6, Chloride 106, Carbon Dioxide 23, 

Anion Gap 21 H, BUN 10, Creatinine 0.6 L, Est GFR ( Amer) > 60, Est GFR (

Non-Af Amer) > 60, Random Glucose 115 H, Calcium 9.6, Magnesium 1.9, Total 

Bilirubin 0.9, AST 65 H D, ALT 91 H, Alkaline Phosphatase 104, Total Protein 

8.5 H, Albumin 4.7, Globulin 3.8, Albumin/Globulin Ratio 1.2


06/05/18 07:21: WBC 11.9 H D, RBC 4.76, Hgb 13.5, Hct 41.5, MCV 87.2, MCH 28.4, 

MCHC 32.5, RDW 13.5, Plt Count 302, MPV 9.8, Gran % 59.6, Lymph % (Auto) 32.1, 

Mono % (Auto) 6.6 H, Eos % (Auto) 1.4 L, Baso % (Auto) 0.3, Gran # 7.09 H, 

Lymph # (Auto) 3.8 H, Mono # (Auto) 0.8 H, Eos # (Auto) 0.2, Baso # (Auto) 0.03


06/04/18 07:00: RPR Nonreactive


06/04/18 07:00: TSH 3rd Generation 1.26


06/04/18 07:00: Triglycerides 59, Cholesterol 95 L, LDL Cholesterol Direct 42, 

HDL Cholesterol 36


06/03/18 20:21: Alcohol, Quantitative < 10


06/03/18 20:21: Salicylates < 1 L, Acetaminophen < 10.0 L


06/03/18 20:21: Sodium 142, Potassium 3.5 L, Chloride 103, Carbon Dioxide 23, 

Anion Gap 19, BUN 7, Creatinine 0.5 L, Est GFR ( Amer) > 60, Est GFR (Non

-Af Amer) > 60, Random Glucose 103, Calcium 9.4, Magnesium 1.8, Total Bilirubin 

1.6 H, AST 37 H, ALT 63 H, Alkaline Phosphatase 97, Total Protein 9.0 H, 

Albumin 4.9 H, Globulin 4.1, Albumin/Globulin Ratio 1.2


06/03/18 20:21: WBC 16.3 H, RBC 4.80, Hgb 13.8, Hct 41.1, MCV 85.6, MCH 28.8, 

MCHC 33.6, RDW 13.4, Plt Count 290, MPV 9.7, Gran % 75.5 H, Lymph % (Auto) 16.2 

L, Mono % (Auto) 8.0 H, Eos % (Auto) 0.1 L, Baso % (Auto) 0.2, Gran # 12.33 H, 

Lymph # (Auto) 2.7, Mono # (Auto) 1.3 H, Eos # (Auto) 0.0, Baso # (Auto) 0.03


06/03/18 20:02: Urine Opiates Screen Positive H, Urine Methadone Screen Negative

, Ur Barbiturates Screen Negative, Ur Phencyclidine Scrn Negative, Ur 

Amphetamines Screen Negative, U Benzodiazepines Scrn Positive H, U Oth Cocaine 

Metabols Positive H, U Cannabinoids Screen Negative


06/03/18 20:02: Urine Color Yellow, Urine Appearance Clear, Urine pH 5.5, Ur 

Specific Gravity >= 1.030, Urine Protein Negative, Urine Glucose (UA) Negative, 

Urine Ketones Trace H, Urine Blood Negative, Urine Nitrate Negative, Urine 

Bilirubin Negative, Urine Urobilinogen 0.2, Ur Leukocyte Esterase Negative











Vital Signs











  Temp Pulse Resp BP Pulse Ox


 


 06/05/18 07:00  98.5 F  125 H  18  147/97 H  100


 


 06/04/18 15:00   89   126/72 


 


 06/04/18 07:33  98.5 F  84  20  133/79 


 


 06/04/18 05:45    18  


 


 06/04/18 04:03  98.2 F  87  20  133/72  98


 


 06/04/18 03:24  98.2 F  87  20  133/72  98


 


 06/04/18 01:30  99.2 F  94 H  20  146/77  99


 


 06/03/18 19:44  98.4 F  80  18  107/92 H  98














 











Temp Pulse Resp BP Pulse Ox


 


 98.0 F   144 H  20   139/82   100 


 


 06/07/18 07:15  06/07/18 07:15  06/07/18 07:15  06/07/18 07:15  06/05/18 07:00








DSM 5 Symptoms Update: 





Shortly pt is 18yo  Female with reported history of psychosis, most 

likely schizophrenia, pt has one psych admission to [facility] last 

year, pt was initially admitted to ICU, confused and agitated, pt was having 

command type hallucinations back then, was found [place] the train station, "doing 

exorcism", had impression that her father was not her biological father, pt 

staid here for almost a month, after the d/c  pt was noncompliant with 

medications and follow up appts, pt was using drugs, as a result pt was confused

, was wondering on streets, was psychotic, was not able to function. Pt 

required further evaluation and stabilization, meds resumption and titration. 





pt presented in catatonic stage, patient was observed standing in the middle of 

the whole way, not moving, this writer gave stat dose of Ativan 1 mg by mouth, 

with immediate improvement, patient was moving faster, talkative fall sentences.





Patient presented to be psychotic, disorganized, inappropriate affect, when was 

asked about the state hospitalization, pt said "I had reasons". 





as per RN report pt was disorganized, try to walk topless, needs constant 

redirection. 


frequent PRN, as per RN pt was responding to geodon, will increase benzos





pt is disorganized, wondering, confused, was not able to locate her room. 





Impression:


schizophrenia


r/o substance induced psychosis. 


Medication Change: Yes (seroquel decreaased, geodon started.)


Medical Record Reviewed: Yes





Mental Status Examination





- Cognitive Function


Orientation: Person


Memory: Impaired


Attention: Poor


Concentration: Poor


Association: Loose


Fund of Knowledge: Poor





- Mood


Mood: Neutral, Other (inapropriate)





- Affect


Affect: Broad, Other (pt was giggling inappropriately, )





- Formal Thought Process


Formal Thought Process: Hallucinations, Delusions, Paranoia, Loosening of 

associations, Circumstantial





- Suicidal Ideation


Suicidal Ideation: No





- Homicidal Ideation


Homicidal Ideation: No





Goal/Treatment Plan





- Goal/Treatment Plan


Need for Continued Stay: Remain at risks for inpatient hospitalization, Severe 

depression anxiety, Discharge may exacerbated symptoms, Severe functional 

impairment


Progress Toward Problem(s) and Goals/Treatment Plan: 


Milieu/structure/supportive therapy 


Medical consult will be called for leukocytosis


seroquel was discontinued


geodon  40 mg a.m. and at bedtime for psychosis


ativan will be increased to 1 mg mg po tid for catatonia


collaterals from family appreciated


SW consultation for discharge plan and social issues 


Follow up on labs 


Will monitor closely 


Pt was educated about risk/benefits and alternatives of medications, coping 

strategies (safety plan, suicide prevention), relapse prevention, importance of 

follow up with psychiatrist and therapist, stay away from drugs/alcohol/smoking





Estimated Date of D/C: 06/29/18

## 2018-06-09 NOTE — PCM.PYCHPN
Psychiatric Progress Note





- Psychiatric Progress Note


Patient seen today, length of contact: 25 min


Problems Identified/Issues Discussed: 


Patient is 16yo  Female with history of psychosis and drug use, 

noncompliance with medications and outpatient treatment at [facility] who was 

admitted to the psychiatric unit with symptoms of active hallucinations and 

disorganization in context of opiate, cocaine and benzo use.  


I reviewed recent notes and met with patient at bedside. Her appearance is 

unkempt and superficial though she is oriented to month, year, location and 

circumstances. She denies any new issues since yesterday and indicates that she 

is feeling all right. Her affect is flat, odd and preoccupied. She denies any 

side effects from her medications or any new discomfort/pain. Her responses are 

brief and relevant to questioning, she was not observed to be responding to 

internal stimuli during my visit. 


Staff notes indicate that patient has been disorganized on the unit though 

focus and orientation have been improving. Behavior is less bizarre.   Speech 

and communication are also recently more spontaneous. She has been taking her 

medications and has been able to comply with staff requests. She didn't attend 

group yesterday. There were no behavioral issues overnight. Patient still 

remains unpredictable. 








Diagnostic Results: 


schizophrenia


r/o substance induced psychosis.








Medication Change: Yes (seroquel decreaased, geodon started.)


Medical Record Reviewed: Yes





Mental Status Examination





- Cognitive Function


Orientation: Person


Memory: Impaired


Attention: Poor


Concentration: Poor


Association: Loose


Fund of Knowledge: Poor





- Mood


Mood: Neutral, Other (inapropriate)





- Affect


Affect: Broad, Other (pt was giggling inappropriately, )





- Formal Thought Process


Formal Thought Process: Hallucinations, Delusions, Paranoia, Loosening of 

associations, Circumstantial





- Suicidal Ideation


Suicidal Ideation: No





- Homicidal Ideation


Homicidal Ideation: No





Goal/Treatment Plan





- Goal/Treatment Plan


Need for Continued Stay: Remain at risks for inpatient hospitalization, Severe 

depression anxiety, Discharge may exacerbated symptoms, Severe functional 

impairment


Progress Toward Problem(s) and Goals/Treatment Plan: 








* c/w current tx and plan


* No new weekend labs thus far


* Vitals reviewed and noted below:


 Selected Entries











  06/08/18 06/08/18





  06:57 15:44


 


Temperature 97.6 F 


 


Pulse Rate 71 63


 


Respiratory 20 





Rate  


 


Blood Pressure 110/65 135/75








Estimated Date of D/C: 06/29/18

## 2018-06-10 NOTE — PCM.PYCHPN
Psychiatric Progress Note





- Psychiatric Progress Note


Patient seen today, length of contact: 25 min


Problems Identified/Issues Discussed: 


 


I have reviewed assessment. Patient is 16yo  Female with history of 

psychosis and drug use, noncompliance with medications and outpatient treatment 

at [facility] who was admitted to the psychiatric unit with symptoms of active 

hallucinations and disorganization in context of opiate, cocaine and benzo use.

  





I reviewed recent notes and met with patient at bedside again. Her appearance 

is unkempt and she remains oriented to month, year, location.  She is 

superficially aware of the circumstances of this admission. Patient denies any 

new issues since yesterday and indicates that she is feeling all right. Affect 

is a little more related and reactive though overall it remains flat and 

preoccupied. She denies any side effects from her medications or any new 

discomfort/pain. Her responses are brief and relevant to questioning, she was 

not observed to be responding to internal stimuli during my visit. 





Staff notes indicate that patient has been disorganized on the unit though 

focus and orientation have been improving. Behavior is less bizarre.   


Speech and communication are also more spontaneous this weekend. She has been 

taking her medications and has been able to comply with staff requests. She 

didnt attend group this weekend. There were no behavioral issues over the 

weekend. 


Diagnostic Results: 


schizophrenia


r/o substance induced psychosis.








Medication Change: Yes (seroquel decreaased, geodon started.)


Medical Record Reviewed: Yes





Mental Status Examination





- Cognitive Function


Orientation: Person


Memory: Impaired


Attention: Poor


Concentration: Poor


Association: Loose


Fund of Knowledge: Poor





- Mood


Mood: Neutral, Other (inapropriate)





- Affect


Affect: Broad, Other (pt was giggling inappropriately, )





- Formal Thought Process


Formal Thought Process: Hallucinations, Delusions, Paranoia, Loosening of 

associations, Circumstantial





- Suicidal Ideation


Suicidal Ideation: No





- Homicidal Ideation


Homicidal Ideation: No





Goal/Treatment Plan





- Goal/Treatment Plan


Need for Continued Stay: Remain at risks for inpatient hospitalization, Severe 

depression anxiety, Discharge may exacerbated symptoms, Severe functional 

impairment


Progress Toward Problem(s) and Goals/Treatment Plan: 








* c/w current tx and plan


* No new weekend labs 


* Vitals reviewed and noted below:











  06/09/18 06/09/18





  06:28 16:00


 


Temperature 97.8 F 


 


Pulse Rate 88 93 H


 


Respiratory 20 





Rate  


 


Blood Pressure 111/21 L 114/65








 





Estimated Date of D/C: 06/29/18

## 2018-06-11 NOTE — PCM.BM
<Yordy Blount - Last Filed: 06/11/18 12:04>





Treatment Plan Problems





- Problems identified on initial assessmt


  ** Altered thoughts


Date Initiated: 06/04/18


Time Initiated: 06:40


Assessment reference: NA


Status: Active


Priority: 2





  ** Substance abuse


Date Initiated: 06/04/18


Time Initiated: 06:41


Date resolved: 06/11/18


Assessment reference: NA


Status: Active





  ** Auditory Hallucinations


Date Initiated: 06/04/18


Time Initiated: 09:45


Date resolved: 06/11/18


Assessment reference: NA


Status: Active


Priority: 1





  ** Impaired Communication


Date Initiated: 06/04/18


Time Initiated: 09:45


Assessment reference: NA


Status: Active


Priority: 3





  ** Depressive Symptoms


Date Initiated: 06/04/18


Time Initiated: 09:46


Assessment reference: NA


Status: Active


Priority: 4





Treatment assets and liabiliti


Patient Assests: ADL independent, good support system, good past tx response, 

good interpersonal skills


Patient Liabilities: substance abuse





- Milieu Protocol


Maintain good personal hygiene: daily Encourage regular showers, daily Remind 

patient to perform daily oral care, daily Assist patient to perform ADL's


Maintain personal safety: every shift Educate patient to report safety concerns 

to staff, every shift Monitor environment for contraband/sharps


Medication safety: Monitor for expected outcome, potential side effects: every 

shift, Assess barriers to learning: every shift, Assess readiness for 

medication education: every shift


Milieu Narrative: 








* c/w current tx and plan


* No new weekend labs 


* Vitals reviewed and noted below:











  06/09/18 06/09/18





  06:28 16:00


 


Temperature 97.8 F 


 


Pulse Rate 88 93 H


 


Respiratory 20 





Rate  


 


Blood Pressure 111/21 L 114/65








 








Family Contact


Family involvement: Family/SO is involved


Family contact: Patient agrees to contact


Family contact name: Opal Brown([phone]) sister


Family contacted how many times per week?: 2





- Goals for Treatment


Patient goals for treatment: "To just chill."





Discharge/Continuing Care





- Education Needs


Education Needs: Patient Medication, Patient Coping Skills, Patient Aftercare 

Safety Plan





- Discharge


Discharge Criteria: Free of paranoid thoughts, Normal sleep pattern





- Treatment Team Participation


Patient/Family/SO Statement: 








* c/w current tx and plan


* No new weekend labs 


* Vitals reviewed and noted below:











  06/09/18 06/09/18





  06:28 16:00


 


Temperature 97.8 F 


 


Pulse Rate 88 93 H


 


Respiratory 20 





Rate  


 


Blood Pressure 111/21 L 114/65








 








Treatment Plan Review





- Problem


  ** Altered thoughts


Time Initiated: 06:40





  ** Substance abuse


Time Initiated: 06:41





  ** Auditory Hallucinations


Time Initiated: 09:45





  ** Impaired Communication


Time Initiated: 09:45





  ** Depressive Symptoms


Time Initiated: 09:46





<Hattie Dsouza - Last Filed: 06/11/18 16:58>





- Diagnosis


(1) Polysubstance abuse


Status: Acute   


Interventions: 





06/11/18 16:58


patient still has limited insight into drug abuse








(2) Schizophrenia


Status: Acute   


Interventions: 





06/11/18 16:58


insight is better, patient still has some psychotic symptoms, catatonia is 

better, patient is compliant with the medications and treatment plan, denied 

thoughts of harming herself or others


Patient tolerated medications well


 no side effects observed or reported Aims 0, no EPS








<Kay Wallace - Last Filed: 06/11/18 17:16>

## 2018-06-11 NOTE — PCM.PYCHPN
Psychiatric Progress Note





- Psychiatric Progress Note


Patient seen today, length of contact: 30min


Patient Chief Complaint: 





"I am just alright, nothing, he-he"


Problems Identified/Issues Discussed: 





Suicide/ homicide prevention, past psychiatric h/o, current psychiatric symptoms

, medical problems, risk/benefits and alternatives of medications, medications 

compliance, coping strategies, substance abuse h/o, relapse prevention, 

importance of follow up with psychiatrist and therapist, discharge plan. 


Medical Problems: 





see HPI


Diagnostic Results: 














 06/05/18 07:21 





 06/05/18 07:21 





 Lab Results





06/05/18 07:21: Sodium 146, Potassium 3.6, Chloride 106, Carbon Dioxide 23, 

Anion Gap 21 H, BUN 10, Creatinine 0.6 L, Est GFR ( Amer) > 60, Est GFR (

Non-Af Amer) > 60, Random Glucose 115 H, Calcium 9.6, Magnesium 1.9, Total 

Bilirubin 0.9, AST 65 H D, ALT 91 H, Alkaline Phosphatase 104, Total Protein 

8.5 H, Albumin 4.7, Globulin 3.8, Albumin/Globulin Ratio 1.2


06/05/18 07:21: WBC 11.9 H D, RBC 4.76, Hgb 13.5, Hct 41.5, MCV 87.2, MCH 28.4, 

MCHC 32.5, RDW 13.5, Plt Count 302, MPV 9.8, Gran % 59.6, Lymph % (Auto) 32.1, 

Mono % (Auto) 6.6 H, Eos % (Auto) 1.4 L, Baso % (Auto) 0.3, Gran # 7.09 H, 

Lymph # (Auto) 3.8 H, Mono # (Auto) 0.8 H, Eos # (Auto) 0.2, Baso # (Auto) 0.03


06/04/18 07:00: RPR Nonreactive


06/04/18 07:00: TSH 3rd Generation 1.26


06/04/18 07:00: Triglycerides 59, Cholesterol 95 L, LDL Cholesterol Direct 42, 

HDL Cholesterol 36


06/03/18 20:21: Alcohol, Quantitative < 10


06/03/18 20:21: Salicylates < 1 L, Acetaminophen < 10.0 L


06/03/18 20:21: Sodium 142, Potassium 3.5 L, Chloride 103, Carbon Dioxide 23, 

Anion Gap 19, BUN 7, Creatinine 0.5 L, Est GFR ( Amer) > 60, Est GFR (Non

-Af Amer) > 60, Random Glucose 103, Calcium 9.4, Magnesium 1.8, Total Bilirubin 

1.6 H, AST 37 H, ALT 63 H, Alkaline Phosphatase 97, Total Protein 9.0 H, 

Albumin 4.9 H, Globulin 4.1, Albumin/Globulin Ratio 1.2


06/03/18 20:21: WBC 16.3 H, RBC 4.80, Hgb 13.8, Hct 41.1, MCV 85.6, MCH 28.8, 

MCHC 33.6, RDW 13.4, Plt Count 290, MPV 9.7, Gran % 75.5 H, Lymph % (Auto) 16.2 

L, Mono % (Auto) 8.0 H, Eos % (Auto) 0.1 L, Baso % (Auto) 0.2, Gran # 12.33 H, 

Lymph # (Auto) 2.7, Mono # (Auto) 1.3 H, Eos # (Auto) 0.0, Baso # (Auto) 0.03


06/03/18 20:02: Urine Opiates Screen Positive H, Urine Methadone Screen Negative

, Ur Barbiturates Screen Negative, Ur Phencyclidine Scrn Negative, Ur 

Amphetamines Screen Negative, U Benzodiazepines Scrn Positive H, U Oth Cocaine 

Metabols Positive H, U Cannabinoids Screen Negative


06/03/18 20:02: Urine Color Yellow, Urine Appearance Clear, Urine pH 5.5, Ur 

Specific Gravity >= 1.030, Urine Protein Negative, Urine Glucose (UA) Negative, 

Urine Ketones Trace H, Urine Blood Negative, Urine Nitrate Negative, Urine 

Bilirubin Negative, Urine Urobilinogen 0.2, Ur Leukocyte Esterase Negative











Vital Signs











  Temp Pulse Resp BP Pulse Ox


 


 06/05/18 07:00  98.5 F  125 H  18  147/97 H  100


 


 06/04/18 15:00   89   126/72 


 


 06/04/18 07:33  98.5 F  84  20  133/79 


 


 06/04/18 05:45    18  


 


 06/04/18 04:03  98.2 F  87  20  133/72  98


 


 06/04/18 03:24  98.2 F  87  20  133/72  98


 


 06/04/18 01:30  99.2 F  94 H  20  146/77  99


 


 06/03/18 19:44  98.4 F  80  18  107/92 H  98














 











Temp Pulse Resp BP Pulse Ox


 


 98.0 F   144 H  20   139/82   100 


 


 06/07/18 07:15  06/07/18 07:15  06/07/18 07:15  06/07/18 07:15  06/05/18 07:00











 











Temp Pulse Resp BP Pulse Ox


 


 97.8 F   96 H  20   128/77   100 


 


 06/11/18 06:35  06/11/18 16:16  06/11/18 06:35  06/11/18 16:16  06/05/18 07:00








DSM 5 Symptoms Update: 





Shortly pt is 18yo  Female with reported history of psychosis, most 

likely schizophrenia, pt has one psych admission to [facility] last 

year, pt was initially admitted to ICU, confused and agitated, pt was having 

command type hallucinations back then, [place] the train station, "doing 

exorcism", had impression that her father was not her biological father, pt 

staid here for almost a month, after the d/c  pt was noncompliant with 

medications and follow up appts, pt was using drugs, as a result pt was confused

, was wondering on streets, was psychotic, was not able to function. Pt 

required further evaluation and stabilization, meds resumption and titration. 





last week patient was in catatonic stage, after benzodiazepines were started, 

patient improved significantly, patient is ambulating, talking faster than she 

used to, at the same time patient is still psychotic but with much improvement, 

last admission pt thought that her father was not biological father, but now "I 

know that he is my father, we talk with my sister, he is my biological father". 





as per RN report pt was compliant with meds and no agitation no aggression.


pt responding better to geodon, dose is in titration, benzos added. 





pt is disorganized, but less confused. 





Impression:


schizophrenia


r/o substance induced psychosis. 


Medication Change: Yes (Geodon increased)


Medical Record Reviewed: Yes


Consults ordered or reviewed: 





medical consult appreciated








Mental Status Examination





- Cognitive Function


Orientation: Person


Memory: Impaired


Attention: Poor


Concentration: Poor


Association: Loose


Fund of Knowledge: Poor





- Mood


Mood: Neutral, Other (inapropriate)





- Affect


Affect: Broad, Other (pt was giggling inappropriately, )





- Formal Thought Process


Formal Thought Process: Hallucinations, Delusions, Paranoia, Loosening of 

associations, Circumstantial





- Suicidal Ideation


Suicidal Ideation: No





- Homicidal Ideation


Homicidal Ideation: No





Goal/Treatment Plan





- Goal/Treatment Plan


Need for Continued Stay: Remain at risks for inpatient hospitalization, Severe 

depression anxiety, Discharge may exacerbated symptoms, Severe functional 

impairment


Progress Toward Problem(s) and Goals/Treatment Plan: 


Milieu/structure/supportive therapy 


Medical consult will be called for leukocytosis


seroquel was discontinued


geodon  40 mg a.m. and 60mg at bedtime for psychosis


ativan 1 mg mg po tid for catatonia


collaterals from family appreciated


SW consultation for discharge plan and social issues 


Follow up on labs 


Will monitor closely 


Pt was educated about risk/benefits and alternatives of medications, coping 

strategies (safety plan, suicide prevention), relapse prevention, importance of 

follow up with psychiatrist and therapist, stay away from drugs/alcohol/smoking





Estimated Date of D/C: 06/29/18

## 2018-06-12 NOTE — PCM.[email or web address]
Psychiatric Progress Note





- Psychiatric Progress Note


Patient seen today, length of contact: 30min


Patient Chief Complaint: 





"voices told me to cheer up"


Problems Identified/Issues Discussed: 





Suicide/ homicide prevention, past psychiatric h/o, current psychiatric symptoms

, medical problems, risk/benefits and alternatives of medications, medications 

compliance, coping strategies, substance abuse h/o, relapse prevention, 

importance of follow up with psychiatrist and therapist, discharge plan. 


Medical Problems: 





see HPI


Diagnostic Results: 














 06/05/18 07:21 





 06/05/18 07:21 





 Lab Results





06/05/18 07:21: Sodium 146, Potassium 3.6, Chloride 106, Carbon Dioxide 23, 

Anion Gap 21 H, BUN 10, Creatinine 0.6 L, Est GFR ( Amer) > 60, Est GFR (

Non-Af Amer) > 60, Random Glucose 115 H, Calcium 9.6, Magnesium 1.9, Total 

Bilirubin 0.9, AST 65 H D, ALT 91 H, Alkaline Phosphatase 104, Total Protein 

8.5 H, Albumin 4.7, Globulin 3.8, Albumin/Globulin Ratio 1.2


06/05/18 07:21: WBC 11.9 H D, RBC 4.76, Hgb 13.5, Hct 41.5, MCV 87.2, MCH 28.4, 

MCHC 32.5, RDW 13.5, Plt Count 302, MPV 9.8, Gran % 59.6, Lymph % (Auto) 32.1, 

Mono % (Auto) 6.6 H, Eos % (Auto) 1.4 L, Baso % (Auto) 0.3, Gran # 7.09 H, 

Lymph # (Auto) 3.8 H, Mono # (Auto) 0.8 H, Eos # (Auto) 0.2, Baso # (Auto) 0.03


06/04/18 07:00: RPR Nonreactive


06/04/18 07:00: TSH 3rd Generation 1.26


06/04/18 07:00: Triglycerides 59, Cholesterol 95 L, LDL Cholesterol Direct 42, 

HDL Cholesterol 36


06/03/18 20:21: Alcohol, Quantitative < 10


06/03/18 20:21: Salicylates < 1 L, Acetaminophen < 10.0 L


06/03/18 20:21: Sodium 142, Potassium 3.5 L, Chloride 103, Carbon Dioxide 23, 

Anion Gap 19, BUN 7, Creatinine 0.5 L, Est GFR ( Amer) > 60, Est GFR (Non

-Af Amer) > 60, Random Glucose 103, Calcium 9.4, Magnesium 1.8, Total Bilirubin 

1.6 H, AST 37 H, ALT 63 H, Alkaline Phosphatase 97, Total Protein 9.0 H, 

Albumin 4.9 H, Globulin 4.1, Albumin/Globulin Ratio 1.2


06/03/18 20:21: WBC 16.3 H, RBC 4.80, Hgb 13.8, Hct 41.1, MCV 85.6, MCH 28.8, 

MCHC 33.6, RDW 13.4, Plt Count 290, MPV 9.7, Gran % 75.5 H, Lymph % (Auto) 16.2 

L, Mono % (Auto) 8.0 H, Eos % (Auto) 0.1 L, Baso % (Auto) 0.2, Gran # 12.33 H, 

Lymph # (Auto) 2.7, Mono # (Auto) 1.3 H, Eos # (Auto) 0.0, Baso # (Auto) 0.03


06/03/18 20:02: Urine Opiates Screen Positive H, Urine Methadone Screen Negative

, Ur Barbiturates Screen Negative, Ur Phencyclidine Scrn Negative, Ur 

Amphetamines Screen Negative, U Benzodiazepines Scrn Positive H, U Oth Cocaine 

Metabols Positive H, U Cannabinoids Screen Negative


06/03/18 20:02: Urine Color Yellow, Urine Appearance Clear, Urine pH 5.5, Ur 

Specific Gravity >= 1.030, Urine Protein Negative, Urine Glucose (UA) Negative, 

Urine Ketones Trace H, Urine Blood Negative, Urine Nitrate Negative, Urine 

Bilirubin Negative, Urine Urobilinogen 0.2, Ur Leukocyte Esterase Negative











Vital Signs











  Temp Pulse Resp BP Pulse Ox


 


 06/05/18 07:00  98.5 F  125 H  18  147/97 H  100


 


 06/04/18 15:00   89   126/72 


 


 06/04/18 07:33  98.5 F  84  20  133/79 


 


 06/04/18 05:45    18  


 


 06/04/18 04:03  98.2 F  87  20  133/72  98


 


 06/04/18 03:24  98.2 F  87  20  133/72  98


 


 06/04/18 01:30  99.2 F  94 H  20  146/77  99


 


 06/03/18 19:44  98.4 F  80  18  107/92 H  98














 











Temp Pulse Resp BP Pulse Ox


 


 98.0 F   144 H  20   139/82   100 


 


 06/07/18 07:15  06/07/18 07:15  06/07/18 07:15  06/07/18 07:15  06/05/18 07:00











 











Temp Pulse Resp BP Pulse Ox


 


 97.8 F   96 H  20   128/77   100 


 


 06/11/18 06:35  06/11/18 16:16  06/11/18 06:35  06/11/18 16:16  06/05/18 07:00








DSM 5 Symptoms Update: 





Shortly pt is 16yo  Female with reported history of psychosis, most 

likely schizophrenia, pt has one psych admission to [facility] last 

year, pt was initially admitted to ICU, confused and agitated, pt was having 

command type hallucinations back then, [place], "doing 

exorcism", had impression that her father was not her biological father, pt 

staid here for almost a month, after the d/c  pt was noncompliant with 

medications and follow up appts, pt was using drugs, as a result pt was confused

, was wondering on streets, was psychotic, was not able to function. Pt 

required further evaluation and stabilization, meds resumption and titration. 





last week patient was in catatonic stage, after benzodiazepines were started, 

patient improved significantly, patient is ambulating, talking faster than she 

used to, at the same time patient is still psychotic but with much improvement, 

pt reported that she still hears voices said "they told me to cheer up", pt is 

oddly related, smiling and giggling inappropriately. 





as per RN report pt was compliant with meds and no agitation no aggression.


pt responding better to geodon, dose is in titration, benzos added. 





pt is disorganized, but less confused. 





Impression:


schizophrenia


r/o substance induced psychosis. 


Medication Change: Yes (Geodon increased yesterday)


Medical Record Reviewed: Yes





Mental Status Examination





- Cognitive Function


Orientation: Person


Memory: Impaired


Attention: Poor (some improvement)


Concentration: Poor (some improvement)


Association: Loose (some improvement)


Fund of Knowledge: Poor





- Mood


Mood: Neutral, Other (inapropriate)





- Affect


Affect: Broad, Other (pt was giggling inappropriately, )





- Formal Thought Process


Formal Thought Process: Hallucinations, Delusions, Paranoia, Loosening of 

associations, Circumstantial





- Suicidal Ideation


Suicidal Ideation: No





- Homicidal Ideation


Homicidal Ideation: No





Goal/Treatment Plan





- Goal/Treatment Plan


Need for Continued Stay: Remain at risks for inpatient hospitalization, Severe 

depression anxiety, Discharge may exacerbated symptoms, Severe functional 

impairment


Progress Toward Problem(s) and Goals/Treatment Plan: 


Milieu/structure/supportive therapy 


Medical consult will be called for leukocytosis


seroquel was discontinued


geodon  40 mg a.m. and 60mg at bedtime for psychosis


ativan 1 mg mg po tid for catatonia


collaterals from family appreciated


SW consultation for discharge plan and social issues 


Follow up on labs 


Will monitor closely 


Pt was educated about risk/benefits and alternatives of medications, coping 

strategies (safety plan, suicide prevention), relapse prevention, importance of 

follow up with psychiatrist and therapist, stay away from drugs/alcohol/smoking





Estimated Date of D/C: 06/29/18

## 2018-06-13 NOTE — PCM.[email or web address]
Psychiatric Progress Note





- Psychiatric Progress Note


Patient seen today, length of contact: 30min


Patient Chief Complaint: 





"voices told me to cheer up"


Problems Identified/Issues Discussed: 





Suicide/ homicide prevention, past psychiatric h/o, current psychiatric symptoms

, medical problems, risk/benefits and alternatives of medications, medications 

compliance, coping strategies, substance abuse h/o, relapse prevention, 

importance of follow up with psychiatrist and therapist, discharge plan. 


Medical Problems: 





see HPI


Diagnostic Results: 














 06/05/18 07:21 





 06/05/18 07:21 





 Lab Results





06/05/18 07:21: Sodium 146, Potassium 3.6, Chloride 106, Carbon Dioxide 23, 

Anion Gap 21 H, BUN 10, Creatinine 0.6 L, Est GFR ( Amer) > 60, Est GFR (

Non-Af Amer) > 60, Random Glucose 115 H, Calcium 9.6, Magnesium 1.9, Total 

Bilirubin 0.9, AST 65 H D, ALT 91 H, Alkaline Phosphatase 104, Total Protein 

8.5 H, Albumin 4.7, Globulin 3.8, Albumin/Globulin Ratio 1.2


06/05/18 07:21: WBC 11.9 H D, RBC 4.76, Hgb 13.5, Hct 41.5, MCV 87.2, MCH 28.4, 

MCHC 32.5, RDW 13.5, Plt Count 302, MPV 9.8, Gran % 59.6, Lymph % (Auto) 32.1, 

Mono % (Auto) 6.6 H, Eos % (Auto) 1.4 L, Baso % (Auto) 0.3, Gran # 7.09 H, 

Lymph # (Auto) 3.8 H, Mono # (Auto) 0.8 H, Eos # (Auto) 0.2, Baso # (Auto) 0.03


06/04/18 07:00: RPR Nonreactive


06/04/18 07:00: TSH 3rd Generation 1.26


06/04/18 07:00: Triglycerides 59, Cholesterol 95 L, LDL Cholesterol Direct 42, 

HDL Cholesterol 36


06/03/18 20:21: Alcohol, Quantitative < 10


06/03/18 20:21: Salicylates < 1 L, Acetaminophen < 10.0 L


06/03/18 20:21: Sodium 142, Potassium 3.5 L, Chloride 103, Carbon Dioxide 23, 

Anion Gap 19, BUN 7, Creatinine 0.5 L, Est GFR ( Amer) > 60, Est GFR (Non

-Af Amer) > 60, Random Glucose 103, Calcium 9.4, Magnesium 1.8, Total Bilirubin 

1.6 H, AST 37 H, ALT 63 H, Alkaline Phosphatase 97, Total Protein 9.0 H, 

Albumin 4.9 H, Globulin 4.1, Albumin/Globulin Ratio 1.2


06/03/18 20:21: WBC 16.3 H, RBC 4.80, Hgb 13.8, Hct 41.1, MCV 85.6, MCH 28.8, 

MCHC 33.6, RDW 13.4, Plt Count 290, MPV 9.7, Gran % 75.5 H, Lymph % (Auto) 16.2 

L, Mono % (Auto) 8.0 H, Eos % (Auto) 0.1 L, Baso % (Auto) 0.2, Gran # 12.33 H, 

Lymph # (Auto) 2.7, Mono # (Auto) 1.3 H, Eos # (Auto) 0.0, Baso # (Auto) 0.03


06/03/18 20:02: Urine Opiates Screen Positive H, Urine Methadone Screen Negative

, Ur Barbiturates Screen Negative, Ur Phencyclidine Scrn Negative, Ur 

Amphetamines Screen Negative, U Benzodiazepines Scrn Positive H, U Oth Cocaine 

Metabols Positive H, U Cannabinoids Screen Negative


06/03/18 20:02: Urine Color Yellow, Urine Appearance Clear, Urine pH 5.5, Ur 

Specific Gravity >= 1.030, Urine Protein Negative, Urine Glucose (UA) Negative, 

Urine Ketones Trace H, Urine Blood Negative, Urine Nitrate Negative, Urine 

Bilirubin Negative, Urine Urobilinogen 0.2, Ur Leukocyte Esterase Negative











Vital Signs











  Temp Pulse Resp BP Pulse Ox


 


 06/05/18 07:00  98.5 F  125 H  18  147/97 H  100


 


 06/04/18 15:00   89   126/72 


 


 06/04/18 07:33  98.5 F  84  20  133/79 


 


 06/04/18 05:45    18  


 


 06/04/18 04:03  98.2 F  87  20  133/72  98


 


 06/04/18 03:24  98.2 F  87  20  133/72  98


 


 06/04/18 01:30  99.2 F  94 H  20  146/77  99


 


 06/03/18 19:44  98.4 F  80  18  107/92 H  98














 











Temp Pulse Resp BP Pulse Ox


 


 98.0 F   144 H  20   139/82   100 


 


 06/07/18 07:15  06/07/18 07:15  06/07/18 07:15  06/07/18 07:15  06/05/18 07:00











 











Temp Pulse Resp BP Pulse Ox


 


 97.8 F   96 H  20   128/77   100 


 


 06/11/18 06:35  06/11/18 16:16  06/11/18 06:35  06/11/18 16:16  06/05/18 07:00








DSM 5 Symptoms Update: 





Shortly pt is 18yo  Female with reported history of psychosis, most 

likely schizophrenia, pt has one psych admission to [facility] last 

year, pt was initially admitted to ICU, confused and agitated, pt was having 

command type hallucinations back then, [place], "doing 

exorcism", had impression that her father was not her biological father, pt 

staid here for almost a month, after the d/c  pt was noncompliant with 

medications and follow up appts, pt was using drugs, as a result pt was confused

, was wondering on streets, was psychotic, was not able to function. Pt 

required further evaluation and stabilization, meds resumption and titration. 





patient improved significantly, patient is ambulating, talking faster than she 

used to, at the same time patient is still psychotic but with much improvement, 

pt reported that she still hears voices said "they told me to cheer up", pt is 

oddly related, smiling and giggling inappropriately. 





as per RN report pt was compliant with meds and no agitation no aggression.


pt responding better to geodon, dose is in titration, benzos added. 





pt is disorganized, but less confused. 





family meeting requested





Impression:


schizophrenia


r/o substance induced psychosis. 


Medication Change: Yes (Geodon increased yesterday)


Medical Record Reviewed: Yes





Mental Status Examination





- Cognitive Function


Orientation: Person


Memory: Impaired


Attention: Poor (some improvement)


Concentration: Poor (some improvement)


Association: Loose (some improvement)


Fund of Knowledge: Poor





- Mood


Mood: Neutral, Other (inapropriate)





- Affect


Affect: Broad, Other (pt was giggling inappropriately, )





- Formal Thought Process


Formal Thought Process: Hallucinations, Delusions, Paranoia, Loosening of 

associations, Circumstantial





- Suicidal Ideation


Suicidal Ideation: No





- Homicidal Ideation


Homicidal Ideation: No





Goal/Treatment Plan





- Goal/Treatment Plan


Need for Continued Stay: Remain at risks for inpatient hospitalization, Severe 

depression anxiety, Discharge may exacerbated symptoms, Severe functional 

impairment


Progress Toward Problem(s) and Goals/Treatment Plan: 


Milieu/structure/supportive therapy 


Medical consult will be called for leukocytosis


seroquel was discontinued


geodon  60 mg a.m. and 60mg at bedtime for psychosis


ativan 1 mg mg po tid for catatonia


collaterals from family appreciated


SW consultation for discharge plan and social issues 


Follow up on labs 


Will monitor closely 


Pt was educated about risk/benefits and alternatives of medications, coping 

strategies (safety plan, suicide prevention), relapse prevention, importance of 

follow up with psychiatrist and therapist, stay away from drugs/alcohol/smoking





Estimated Date of D/C: 06/29/18

## 2018-06-14 NOTE — PCM.[email or web address]
Psychiatric Progress Note





- Psychiatric Progress Note


Patient seen today, length of contact: 30min


Patient Chief Complaint: 





"Ah, I am playing tricks on myself"


Problems Identified/Issues Discussed: 





Suicide/ homicide prevention, past psychiatric h/o, current psychiatric symptoms

, medical problems, risk/benefits and alternatives of medications, medications 

compliance, coping strategies, substance abuse h/o, relapse prevention, 

importance of follow up with psychiatrist and therapist, discharge plan. 


Medical Problems: 





see HPI


Diagnostic Results: 














 06/05/18 07:21 





 06/05/18 07:21 





 Lab Results





06/05/18 07:21: Sodium 146, Potassium 3.6, Chloride 106, Carbon Dioxide 23, 

Anion Gap 21 H, BUN 10, Creatinine 0.6 L, Est GFR ( Amer) > 60, Est GFR (

Non-Af Amer) > 60, Random Glucose 115 H, Calcium 9.6, Magnesium 1.9, Total 

Bilirubin 0.9, AST 65 H D, ALT 91 H, Alkaline Phosphatase 104, Total Protein 

8.5 H, Albumin 4.7, Globulin 3.8, Albumin/Globulin Ratio 1.2


06/05/18 07:21: WBC 11.9 H D, RBC 4.76, Hgb 13.5, Hct 41.5, MCV 87.2, MCH 28.4, 

MCHC 32.5, RDW 13.5, Plt Count 302, MPV 9.8, Gran % 59.6, Lymph % (Auto) 32.1, 

Mono % (Auto) 6.6 H, Eos % (Auto) 1.4 L, Baso % (Auto) 0.3, Gran # 7.09 H, 

Lymph # (Auto) 3.8 H, Mono # (Auto) 0.8 H, Eos # (Auto) 0.2, Baso # (Auto) 0.03


06/04/18 07:00: RPR Nonreactive


06/04/18 07:00: TSH 3rd Generation 1.26


06/04/18 07:00: Triglycerides 59, Cholesterol 95 L, LDL Cholesterol Direct 42, 

HDL Cholesterol 36


06/03/18 20:21: Alcohol, Quantitative < 10


06/03/18 20:21: Salicylates < 1 L, Acetaminophen < 10.0 L


06/03/18 20:21: Sodium 142, Potassium 3.5 L, Chloride 103, Carbon Dioxide 23, 

Anion Gap 19, BUN 7, Creatinine 0.5 L, Est GFR ( Amer) > 60, Est GFR (Non

-Af Amer) > 60, Random Glucose 103, Calcium 9.4, Magnesium 1.8, Total Bilirubin 

1.6 H, AST 37 H, ALT 63 H, Alkaline Phosphatase 97, Total Protein 9.0 H, 

Albumin 4.9 H, Globulin 4.1, Albumin/Globulin Ratio 1.2


06/03/18 20:21: WBC 16.3 H, RBC 4.80, Hgb 13.8, Hct 41.1, MCV 85.6, MCH 28.8, 

MCHC 33.6, RDW 13.4, Plt Count 290, MPV 9.7, Gran % 75.5 H, Lymph % (Auto) 16.2 

L, Mono % (Auto) 8.0 H, Eos % (Auto) 0.1 L, Baso % (Auto) 0.2, Gran # 12.33 H, 

Lymph # (Auto) 2.7, Mono # (Auto) 1.3 H, Eos # (Auto) 0.0, Baso # (Auto) 0.03


06/03/18 20:02: Urine Opiates Screen Positive H, Urine Methadone Screen Negative

, Ur Barbiturates Screen Negative, Ur Phencyclidine Scrn Negative, Ur 

Amphetamines Screen Negative, U Benzodiazepines Scrn Positive H, U Oth Cocaine 

Metabols Positive H, U Cannabinoids Screen Negative


06/03/18 20:02: Urine Color Yellow, Urine Appearance Clear, Urine pH 5.5, Ur 

Specific Gravity >= 1.030, Urine Protein Negative, Urine Glucose (UA) Negative, 

Urine Ketones Trace H, Urine Blood Negative, Urine Nitrate Negative, Urine 

Bilirubin Negative, Urine Urobilinogen 0.2, Ur Leukocyte Esterase Negative











Vital Signs











  Temp Pulse Resp BP Pulse Ox


 


 06/05/18 07:00  98.5 F  125 H  18  147/97 H  100


 


 06/04/18 15:00   89   126/72 


 


 06/04/18 07:33  98.5 F  84  20  133/79 


 


 06/04/18 05:45    18  


 


 06/04/18 04:03  98.2 F  87  20  133/72  98


 


 06/04/18 03:24  98.2 F  87  20  133/72  98


 


 06/04/18 01:30  99.2 F  94 H  20  146/77  99


 


 06/03/18 19:44  98.4 F  80  18  107/92 H  98














 











Temp Pulse Resp BP Pulse Ox


 


 98.0 F   144 H  20   139/82   100 


 


 06/07/18 07:15  06/07/18 07:15  06/07/18 07:15  06/07/18 07:15  06/05/18 07:00











 











Temp Pulse Resp BP Pulse Ox


 


 97.8 F   96 H  20   128/77   100 


 


 06/11/18 06:35  06/11/18 16:16  06/11/18 06:35  06/11/18 16:16  06/05/18 07:00








DSM 5 Symptoms Update: 





Shortly pt is 16yo  Female with reported history of psychosis, most 

likely schizophrenia, pt has one psych admission to [facility] last 

year, pt was initially admitted to ICU, confused and agitated, pt was having 

command type hallucinations back then, [place], "doing 

exorcism", had impression that her father was not her biological father, pt 

staid here for almost a month, after the d/c  pt was noncompliant with 

medications and follow up appts, pt was using drugs, as a result pt was confused

, was wondering on streets, was psychotic, was not able to function. Pt 

required further evaluation and stabilization, meds resumption and titration. 





patient was improving for the past two days, now pt became irritable, pt was 

disorganized, said "I am angry, I am playing tricks on me", pt did not make any 

sense, pt also was observed pacing back and forth, holding her temper, seems 

unpredictable today. 





will hold family meeting tomorrow, most likely pt needs further stabilization. 





as per RN report pt was compliant with meds and no agitation no aggression, but 

today pt is more irritable and agiateed. 


pt responding better to geodon, dose is in titration, benzos added. 





pt is disorganized, but less confused. 





family meeting requested





Impression:


schizophrenia


r/o substance induced psychosis. 


Medication Change: Yes (ativan increased)


Medical Record Reviewed: Yes





Mental Status Examination





- Cognitive Function


Orientation: Person


Memory: Impaired


Attention: Poor (some improvement)


Concentration: Poor (some improvement)


Association: Loose (some improvement)


Fund of Knowledge: Poor





- Mood


Mood: Neutral





- Affect


Affect: Broad, Other (pt was giggling inappropriately, )





- Formal Thought Process


Formal Thought Process: Hallucinations, Delusions, Paranoia, Loosening of 

associations, Circumstantial





- Suicidal Ideation


Suicidal Ideation: No





- Homicidal Ideation


Homicidal Ideation: No





Goal/Treatment Plan





- Goal/Treatment Plan


Need for Continued Stay: Remain at risks for inpatient hospitalization, Severe 

depression anxiety, Discharge may exacerbated symptoms, Severe functional 

impairment


Progress Toward Problem(s) and Goals/Treatment Plan: 


Milieu/structure/supportive therapy 


Medical consult will be called for leukocytosis


seroquel was discontinued


geodon  60 mg a.m. and 60mg at bedtime for psychosis


ativan 2mg po bid for catatonia


collaterals from family appreciated


SW consultation for discharge plan and social issues 


Follow up on labs 


Will monitor closely 


Pt was educated about risk/benefits and alternatives of medications, coping 

strategies (safety plan, suicide prevention), relapse prevention, importance of 

follow up with psychiatrist and therapist, stay away from drugs/alcohol/smoking





Estimated Date of D/C: 06/29/18

## 2018-06-15 LAB
ALBUMIN SERPL-MCNC: 4.4 G/DL (ref 3–4.8)
ALBUMIN/GLOB SERPL: 1.2 {RATIO} (ref 1.1–1.8)
ALT SERPL-CCNC: 120 U/L (ref 7–56)
AST SERPL-CCNC: 70 U/L (ref 14–36)
BASOPHILS # BLD AUTO: 0.02 K/MM3 (ref 0–2)
BASOPHILS NFR BLD: 0.2 % (ref 0–3)
BUN SERPL-MCNC: 11 MG/DL (ref 7–21)
CALCIUM SERPL-MCNC: 9.3 MG/DL (ref 8.4–10.5)
EOSINOPHIL # BLD: 0.1 10*3/UL (ref 0–0.7)
EOSINOPHIL NFR BLD: 1.2 % (ref 1.5–5)
ERYTHROCYTE [DISTWIDTH] IN BLOOD BY AUTOMATED COUNT: 12.9 % (ref 11.5–14.5)
GFR NON-AFRICAN AMERICAN: > 60
GRANULOCYTES # BLD: 7.29 10*3/UL (ref 1.4–6.5)
GRANULOCYTES NFR BLD: 67.5 % (ref 50–68)
HGB BLD-MCNC: 13.1 G/DL (ref 12–16)
LYMPHOCYTES # BLD: 2.8 10*3/UL (ref 1.2–3.4)
LYMPHOCYTES NFR BLD AUTO: 25.5 % (ref 22–35)
MCH RBC QN AUTO: 28.9 PG (ref 25–35)
MCHC RBC AUTO-ENTMCNC: 33.2 G/DL (ref 31–37)
MCV RBC AUTO: 87 FL (ref 80–105)
MONOCYTES # BLD AUTO: 0.6 10*3/UL (ref 0.1–0.6)
MONOCYTES NFR BLD: 5.6 % (ref 1–6)
PLATELET # BLD: 316 10^3/UL (ref 120–450)
PMV BLD AUTO: 9.4 FL (ref 7–11)
RBC # BLD AUTO: 4.53 10^6/UL (ref 3.5–6.1)
WBC # BLD AUTO: 10.8 10^3/UL (ref 4.5–11)

## 2018-06-15 NOTE — PCM.[email or web address]
Psychiatric Progress Note





- Psychiatric Progress Note


Patient seen today, length of contact: 30min


Patient Chief Complaint: 





"........" not talking, starring, as per staff pt eats, drink liquid. 





Problems Identified/Issues Discussed: 





Suicide/ homicide prevention, past psychiatric h/o, current psychiatric symptoms

, medical problems, risk/benefits and alternatives of medications, medications 

compliance, coping strategies, substance abuse h/o, relapse prevention, 

importance of follow up with psychiatrist and therapist, discharge plan. 


Medical Problems: 





see HPI


Diagnostic Results: 














 18 07:21 





 18 07:21 





 Lab Results





18 07:21: Sodium 146, Potassium 3.6, Chloride 106, Carbon Dioxide 23, 

Anion Gap 21 H, BUN 10, Creatinine 0.6 L, Est GFR ( Amer) > 60, Est GFR (

Non-Af Amer) > 60, Random Glucose 115 H, Calcium 9.6, Magnesium 1.9, Total 

Bilirubin 0.9, AST 65 H D, ALT 91 H, Alkaline Phosphatase 104, Total Protein 

8.5 H, Albumin 4.7, Globulin 3.8, Albumin/Globulin Ratio 1.2


18 07:21: WBC 11.9 H D, RBC 4.76, Hgb 13.5, Hct 41.5, MCV 87.2, MCH 28.4, 

MCHC 32.5, RDW 13.5, Plt Count 302, MPV 9.8, Gran % 59.6, Lymph % (Auto) 32.1, 

Mono % (Auto) 6.6 H, Eos % (Auto) 1.4 L, Baso % (Auto) 0.3, Gran # 7.09 H, 

Lymph # (Auto) 3.8 H, Mono # (Auto) 0.8 H, Eos # (Auto) 0.2, Baso # (Auto) 0.03


18 07:00: RPR Nonreactive


18 07:00: TSH 3rd Generation 1.26


18 07:00: Triglycerides 59, Cholesterol 95 L, LDL Cholesterol Direct 42, 

HDL Cholesterol 36


18 20:21: Alcohol, Quantitative < 10


18 20:21: Salicylates < 1 L, Acetaminophen < 10.0 L


18 20:21: Sodium 142, Potassium 3.5 L, Chloride 103, Carbon Dioxide 23, 

Anion Gap 19, BUN 7, Creatinine 0.5 L, Est GFR ( Amer) > 60, Est GFR (Non

-Af Amer) > 60, Random Glucose 103, Calcium 9.4, Magnesium 1.8, Total Bilirubin 

1.6 H, AST 37 H, ALT 63 H, Alkaline Phosphatase 97, Total Protein 9.0 H, 

Albumin 4.9 H, Globulin 4.1, Albumin/Globulin Ratio 1.2


18 20:21: WBC 16.3 H, RBC 4.80, Hgb 13.8, Hct 41.1, MCV 85.6, MCH 28.8, 

MCHC 33.6, RDW 13.4, Plt Count 290, MPV 9.7, Gran % 75.5 H, Lymph % (Auto) 16.2 

L, Mono % (Auto) 8.0 H, Eos % (Auto) 0.1 L, Baso % (Auto) 0.2, Gran # 12.33 H, 

Lymph # (Auto) 2.7, Mono # (Auto) 1.3 H, Eos # (Auto) 0.0, Baso # (Auto) 0.03


18 20:02: Urine Opiates Screen Positive H, Urine Methadone Screen Negative

, Ur Barbiturates Screen Negative, Ur Phencyclidine Scrn Negative, Ur 

Amphetamines Screen Negative, U Benzodiazepines Scrn Positive H, U Oth Cocaine 

Metabols Positive H, U Cannabinoids Screen Negative


18 20:02: Urine Color Yellow, Urine Appearance Clear, Urine pH 5.5, Ur 

Specific Gravity >= 1.030, Urine Protein Negative, Urine Glucose (UA) Negative, 

Urine Ketones Trace H, Urine Blood Negative, Urine Nitrate Negative, Urine 

Bilirubin Negative, Urine Urobilinogen 0.2, Ur Leukocyte Esterase Negative











Vital Signs











  Temp Pulse Resp BP Pulse Ox


 


 18 07:00  98.5 F  125 H  18  147/97 H  100


 


 18 15:00   89   126/72 


 


 18 07:33  98.5 F  84  20  133/79 


 


 18 05:45    18  


 


 18 04:03  98.2 F  87  20  133/72  98


 


 18 03:24  98.2 F  87  20  133/72  98


 


 18 01:30  99.2 F  94 H  20  146/77  99


 


 18 19:44  98.4 F  80  18  107/92 H  98














 











Temp Pulse Resp BP Pulse Ox


 


 98.0 F   144 H  20   139/82   100 


 


 18 07:15  18 07:15  18 07:15  18 07:15  18 07:00











 











Temp Pulse Resp BP Pulse Ox


 


 97.8 F   96 H  20   128/77   100 


 


 18 06:35  18 16:16  18 06:35  18 16:16  18 07:00








DSM 5 Symptoms Update: 





Shortly pt is 16yo  Female with reported history of psychosis, most 

likely schizophrenia, pt has one psych admission to [facility] last 

year, pt was initially admitted to ICU, confused and agitated, pt was having 

command type hallucinations back then, [place] found [place] the train [place], "doing 

exorcism", had impression that her father was not her biological father, pt 

staid here for almost a month, after the d/c  pt was noncompliant with 

medications and follow up appts, pt was using drugs, as a result pt was confused

, was wondering on streets, was psychotic, was not able to function. Pt 

required further evaluation and stabilization, meds resumption and titration. 





patient was improving for the past two days, now pt became irritable, pt was 

disorganized, said "I am angry, I am playing tricks on myself", pt did not make 

any sense, pt also was observed pacing back and forth, holding her temper, 

seems unpredictable, catatonic again. 





6/15/18:


this Writer initiated family meeting with patient's uncle from mother's site of 

the family.


, this writer, patient involved at the family meeting.


Sid Silva([phone])pt's uncle reports pt became increasingly depressed 

at age 12 after the death of her mother ( of cancer after calle for 5years)

, patient's life changed drastically at age of 12, patient's biological father 

moved in back before that patient mother and father was  4 years, on 

top of that patient's sister moved out of the house, and patient household was 

completely changed.


PT's uncle reports pt's psychotic symptoms started last year, in which patient 

was hospitalized at [facility]. 


Pt's uncle verbalized concerns about pt using drugs and affecting her ability 

to function.


this writer educated uncle as well as patient that currently symptoms most 

likely related to the mental illness specifically schizophrenia.


Patient and her uncle were educated about Clozaril, risk, benefits, 

alternatives discussed in details, patient and uncle wanted to try this 

medication, patient and uncle aware that they need to have blood work weekly 

for 6 months, after that q2weks, then monthly. pt and uncle agreed, pt's uncle 

said "I will do anything to help her". 


Pt's uncle agreeable and states pt is not at her baseline. 


Pt and pt's uncle reported having no questions was appreciative.





pt was educated about importance to stay away from drugs and effect drugs on 

brain and worsening of schizophrenia.





as per RN report pt was compliant with meds and no agitation no aggression, but 

today pt is more irritable and agiateed. 


pt responding better to geodon, dose is in titration, benzos added. 





pt is disorganized, and confused, catatonic. 








Impression:


schizophrenia


r/o substance induced psychosis. 


Medication Change: Yes (clozaril started)


Medical Record Reviewed: Yes





Mental Status Examination





- Cognitive Function


Orientation: Person


Memory: Impaired


Attention: Poor (some improvement)


Concentration: Poor (some improvement)


Association: Loose (some improvement)


Fund of Knowledge: Poor





- Mood


Mood: Neutral





- Affect


Affect: Broad, Other (pt was giggling inappropriately, )





- Formal Thought Process


Formal Thought Process: Hallucinations, Delusions, Paranoia, Loosening of 

associations, Circumstantial





- Suicidal Ideation


Suicidal Ideation: No





- Homicidal Ideation


Homicidal Ideation: No





Goal/Treatment Plan





- Goal/Treatment Plan


Need for Continued Stay: Remain at risks for inpatient hospitalization, Severe 

depression anxiety, Discharge may exacerbated symptoms, Severe functional 

impairment


Progress Toward Problem(s) and Goals/Treatment Plan: 


Milieu/structure/supportive therapy 


Medical consult will be called for leukocytosis


seroquel was discontinued


geodon  60 mg a.m. and 60mg at bedtime for psychosis, will be tapered off


clozaril started 12.5mg daily, today day #1, [facility] contacted, pt was 

registered, plan to titrate up


ativan 2mg po bid for catatonia


collaterals from family appreciated, meeting appreciated with uncle 6/15/18


JAIRO consultation for discharge plan and social issues 


Follow up on labs 


Will monitor closely 


Pt was educated about risk/benefits and alternatives of medications, coping 

strategies (safety plan, suicide prevention), relapse prevention, importance of 

follow up with psychiatrist and therapist, stay away from drugs/alcohol/smoking





Estimated Date of D/C: 18

## 2018-06-16 NOTE — PCM.PYCHPN
Psychiatric Progress Note





- Psychiatric Progress Note


Patient seen today, length of contact: 25 min


Problems Identified/Issues Discussed: 


 I have reviewed assessment. Patient is 18yo  Female with history of 

psychosis and drug use, noncompliance with medications and outpatient treatment 

at [facility] who was admitted to the psychiatric unit with symptoms of active 

hallucinations and disorganization in context of opiate, cocaine and benzo use.

  





I reviewed recent notes and met with patient at bedside. Her appearance is a 

little unkempt and she remains oriented to month, year, location.  She is 

superficially aware of the circumstances of this admission. Patient denies any 

new issues since yesterday and indicates that she is feeling okay. Affect 

remains flat and preoccupied with minimal improvement since last weekend. She 

still appears somewhat vacant and passive.


Patient denies any side effects from her medications or any new discomfort/

pain. She continues to agree to initiation of clozaril. Her responses are brief 

and relevant to questioning, she was not observed to be responding to internal 

stimuli during my visit.  





Staff notes indicate that patient has been disorganized and oddly related on 

the unit. Speech is underproductive at times and she can appear catatonic. 

Patient goes to groups and generally keeps to herself. There were no behavioral 

issues overnight.    





  


Diagnostic Results: 


schizophrenia


r/o substance induced psychosis.








Medication Change: Yes (clozaril started)


Medical Record Reviewed: Yes





Mental Status Examination





- Cognitive Function


Orientation: Person


Memory: Impaired


Attention: Poor (some improvement)


Concentration: Poor (some improvement)


Association: Loose (some improvement)


Fund of Knowledge: Poor





- Mood


Mood: Neutral





- Affect


Affect: Broad, Other (pt was giggling inappropriately, )





- Formal Thought Process


Formal Thought Process: Hallucinations, Delusions, Paranoia, Loosening of 

associations, Circumstantial





- Suicidal Ideation


Suicidal Ideation: No





- Homicidal Ideation


Homicidal Ideation: No





Goal/Treatment Plan





- Goal/Treatment Plan


Need for Continued Stay: Remain at risks for inpatient hospitalization, Severe 

depression anxiety, Discharge may exacerbated symptoms, Severe functional 

impairment


Progress Toward Problem(s) and Goals/Treatment Plan: 








* c/w current tx and plan


* geodon  60 mg a.m. and 60mg at bedtime for psychosis, will be tapered off 


* 6/16/18 Day #1 First dose of Clozaril 12.5 mg po daily given


* 6/17/18 Day #2  Clozaril to be increased to 25 mg po daily


* Ativan 2mg po bid for catatonia





* No new weekend labs 


* Vitals reviewed and noted below:


 Selected Entries











  06/15/18 06/15/18





  07:14 16:00


 


Temperature 97.9 F 


 


Pulse Rate 107 H 98 H


 


Respiratory 20 





Rate  


 


Blood Pressure 121/81 122/78








 


Estimated Date of D/C: 06/29/18

## 2018-06-17 NOTE — PCM.PYCHPN
Psychiatric Progress Note





- Psychiatric Progress Note


Patient seen today, length of contact: 25 min


Problems Identified/Issues Discussed: 


 I have reviewed assessment. Patient is 18yo  Female with history of 

psychosis and drug use, noncompliance with medications and outpatient treatment 

at [facility] who was admitted to the psychiatric unit with symptoms of active 

hallucinations and disorganization in context of opiate, cocaine and benzo use.

  





I reviewed recent notes and met with patient in the dayroom. She was observed 

interacting with another patient. It was minor social interaction but it wasn't 

odd or bizarre. Her appearance remains unkempt and she is oriented to month, 

year, location.  She is superficially aware of the circumstances of this 

admission. Patient denies any new issues since yesterday and indicates that she 

is feeling okay. Tolerated first dose of clozaril yesterday. Affect remains 

flat and preoccupied with minimal improvement since last weekend. She still 

appears somewhat vacant and passive. Patient tells me that she "zones out" 

sometimes. She denies hallucinations. Her responses are brief and relevant to 

questioning, she was not observed to be responding to internal stimuli during 

my visit.  





Patient denies any side effects from her medications or any new discomfort/

pain. She continues to agree to increase of clozaril today. 





Staff notes indicate that patient has been disorganized and oddly related on 

the unit. Speech is underproductive at times and she can appear catatonic. 

Patient goes to groups and generally keeps to herself. There were no behavioral 

issues over the weekend.    





  


Diagnostic Results: 


schizophrenia


r/o substance induced psychosis.








Medication Change: Yes (clozaril started and increased)


Medical Record Reviewed: Yes





Mental Status Examination





- Cognitive Function


Orientation: Person


Memory: Impaired


Attention: Poor (some improvement)


Concentration: Poor (some improvement)


Association: Loose (some improvement)


Fund of Knowledge: Poor





- Mood


Mood: Neutral





- Affect


Affect: Constricted, Flat, Other (pt was giggling inappropriately, )





- Speech


Speech: Soft





- Formal Thought Process


Formal Thought Process: Hallucinations (denied all weekend), Delusions, Paranoia

, Loosening of associations, Circumstantial





- Suicidal Ideation


Suicidal Ideation: No





- Homicidal Ideation


Homicidal Ideation: No





Goal/Treatment Plan





- Goal/Treatment Plan


Need for Continued Stay: Remain at risks for inpatient hospitalization, Severe 

depression anxiety, Discharge may exacerbated symptoms, Severe functional 

impairment


Progress Toward Problem(s) and Goals/Treatment Plan: 








* c/w current tx and plan


* geodon  60 mg a.m. and 60mg at bedtime for psychosis, will be tapered off 


* 6/16/18 Day #1 First dose of Clozaril 12.5 mg po daily given


* 6/17/18 Day #2  Clozaril increased to 25 mg po daily


* 6/18/18 Day #3  Clozaril increased to 25 mg po [facility]


* Ativan 2mg po bid for catatonia





* No new weekend labs 


* Vitals reviewed and noted below:


 Selected Entries











  06/17/18





  07:19


 


Temperature 97.9 F


 


Pulse Rate 139 H


 


Respiratory 20





Rate 


 


Blood Pressure 124/97 H








  


Estimated Date of D/C: 06/29/18

## 2018-06-18 NOTE — PCM.PYCHPN
Psychiatric Progress Note





- Psychiatric Progress Note


Patient seen today, length of contact: 30 minute


Patient Chief Complaint: 





"I'm too excited".


Problems Identified/Issues Discussed: 





Suicide/ homicide prevention, past psychiatric h/o, current psychiatric symptoms

, medical problems, risk/benefits and alternatives of medications, medications 

compliance, coping strategies, substance abuse h/o, relapse prevention, 

importance of follow up with psychiatrist and therapist, discharge plan. 


Medical Problems: 





see HPI


Diagnostic Results: 














 06/05/18 07:21 





 06/05/18 07:21 





 Lab Results





06/05/18 07:21: Sodium 146, Potassium 3.6, Chloride 106, Carbon Dioxide 23, 

Anion Gap 21 H, BUN 10, Creatinine 0.6 L, Est GFR ( Amer) > 60, Est GFR (

Non-Af Amer) > 60, Random Glucose 115 H, Calcium 9.6, Magnesium 1.9, Total 

Bilirubin 0.9, AST 65 H D, ALT 91 H, Alkaline Phosphatase 104, Total Protein 

8.5 H, Albumin 4.7, Globulin 3.8, Albumin/Globulin Ratio 1.2


06/05/18 07:21: WBC 11.9 H D, RBC 4.76, Hgb 13.5, Hct 41.5, MCV 87.2, MCH 28.4, 

MCHC 32.5, RDW 13.5, Plt Count 302, MPV 9.8, Gran % 59.6, Lymph % (Auto) 32.1, 

Mono % (Auto) 6.6 H, Eos % (Auto) 1.4 L, Baso % (Auto) 0.3, Gran # 7.09 H, 

Lymph # (Auto) 3.8 H, Mono # (Auto) 0.8 H, Eos # (Auto) 0.2, Baso # (Auto) 0.03


06/04/18 07:00: RPR Nonreactive


06/04/18 07:00: TSH 3rd Generation 1.26


06/04/18 07:00: Triglycerides 59, Cholesterol 95 L, LDL Cholesterol Direct 42, 

HDL Cholesterol 36


06/03/18 20:21: Alcohol, Quantitative < 10


06/03/18 20:21: Salicylates < 1 L, Acetaminophen < 10.0 L


06/03/18 20:21: Sodium 142, Potassium 3.5 L, Chloride 103, Carbon Dioxide 23, 

Anion Gap 19, BUN 7, Creatinine 0.5 L, Est GFR ( Amer) > 60, Est GFR (Non

-Af Amer) > 60, Random Glucose 103, Calcium 9.4, Magnesium 1.8, Total Bilirubin 

1.6 H, AST 37 H, ALT 63 H, Alkaline Phosphatase 97, Total Protein 9.0 H, 

Albumin 4.9 H, Globulin 4.1, Albumin/Globulin Ratio 1.2


06/03/18 20:21: WBC 16.3 H, RBC 4.80, Hgb 13.8, Hct 41.1, MCV 85.6, MCH 28.8, 

MCHC 33.6, RDW 13.4, Plt Count 290, MPV 9.7, Gran % 75.5 H, Lymph % (Auto) 16.2 

L, Mono % (Auto) 8.0 H, Eos % (Auto) 0.1 L, Baso % (Auto) 0.2, Gran # 12.33 H, 

Lymph # (Auto) 2.7, Mono # (Auto) 1.3 H, Eos # (Auto) 0.0, Baso # (Auto) 0.03


06/03/18 20:02: Urine Opiates Screen Positive H, Urine Methadone Screen Negative

, Ur Barbiturates Screen Negative, Ur Phencyclidine Scrn Negative, Ur 

Amphetamines Screen Negative, U Benzodiazepines Scrn Positive H, U Oth Cocaine 

Metabols Positive H, U Cannabinoids Screen Negative


06/03/18 20:02: Urine Color Yellow, Urine Appearance Clear, Urine pH 5.5, Ur 

Specific Gravity >= 1.030, Urine Protein Negative, Urine Glucose (UA) Negative, 

Urine Ketones Trace H, Urine Blood Negative, Urine Nitrate Negative, Urine 

Bilirubin Negative, Urine Urobilinogen 0.2, Ur Leukocyte Esterase Negative











Vital Signs











  Temp Pulse Resp BP Pulse Ox


 


 06/05/18 07:00  98.5 F  125 H  18  147/97 H  100


 


 06/04/18 15:00   89   126/72 


 


 06/04/18 07:33  98.5 F  84  20  133/79 


 


 06/04/18 05:45    18  


 


 06/04/18 04:03  98.2 F  87  20  133/72  98


 


 06/04/18 03:24  98.2 F  87  20  133/72  98


 


 06/04/18 01:30  99.2 F  94 H  20  146/77  99


 


 06/03/18 19:44  98.4 F  80  18  107/92 H  98














 











Temp Pulse Resp BP Pulse Ox


 


 98.0 F   144 H  20   139/82   100 


 


 06/07/18 07:15  06/07/18 07:15  06/07/18 07:15  06/07/18 07:15  06/05/18 07:00











 











Temp Pulse Resp BP Pulse Ox


 


 97.8 F   96 H  20   128/77   100 


 


 06/11/18 06:35  06/11/18 16:16  06/11/18 06:35  06/11/18 16:16  06/05/18 07:00








DSM 5 Symptoms Update: 


Shortly pt is 18yo  Female with reported history of psychosis, most 

likely schizophrenia, pt has one psych admission to [facility] last 

year, pt was initially admitted to ICU, confused and agitated, pt was having 

command type hallucinations back then, was found [place] the train station, "doing 

exorcism", had impression that her father was not her biological father, pt 

staid here for almost a month, after the d/c  pt was noncompliant with 

medications and follow up appts, pt was using drugs, as a result pt was confused

, was wondering on streets, was psychotic, was not able to function. Pt 

required further evaluation and stabilization, meds resumption and titration. 





patient was started on Clozaril last week Friday, patient symptoms tolerating 

that medication well, patient is on tapering dose of Geodon, patient still 

psychotic, disorganized, catatonic but patient complained to be "too excited". 

At the same time no agitation or aggression, patient is willingly take 

medications, as per staff patient is disorganized, trying to steal food from 

other patients, needs encouragement to take shower.





pt was educated about importance to stay away from drugs and effect drugs on 

brain and worsening of schizophrenia.





pt is disorganized, and confused, catatonic. 





so far patient tolerates medications well, no side effects observed or reported

, aims 0, no EPS.





Impression:


schizophrenia


r/o substance induced psychosis. 





Medication Change: Yes (Clozaril increased, Geodon decreased)


Medical Record Reviewed: Yes


Consults ordered or reviewed: 





medical consult appreciated








Mental Status Examination





- Cognitive Function


Orientation: Person


Memory: Impaired


Attention: Poor (some improvement)


Concentration: Poor (some improvement)


Association: Loose (some improvement)


Fund of Knowledge: Poor





- Mood


Mood: Neutral





- Affect


Affect: Constricted, Flat, Other (pt was giggling inappropriately, )





- Speech


Speech: Soft





- Formal Thought Process


Formal Thought Process: Hallucinations (denied all weekend), Delusions, Paranoia

, Loosening of associations, Circumstantial





- Suicidal Ideation


Suicidal Ideation: No





- Homicidal Ideation


Homicidal Ideation: No





Goal/Treatment Plan





- Goal/Treatment Plan


Need for Continued Stay: Remain at risks for inpatient hospitalization, Severe 

depression anxiety, Discharge may exacerbated symptoms, Severe functional 

impairment


Progress Toward Problem(s) and Goals/Treatment Plan: 


Milieu/structure/supportive therapy 


Medical consult will be called for leukocytosis


seroquel was discontinued


geodon  40 mg a.m. and 40mg at bedtime for psychosis, will be tapered off


clozaril started 25milligrams twice a day for psychosis


ativan 2mg po bid for catatonia


collaterals from family appreciated, meeting appreciated with uncle 6/15/18


SW consultation for discharge plan and social issues 


Follow up on labs 


Will monitor closely 


Pt was educated about risk/benefits and alternatives of medications, coping 

strategies (safety plan, suicide prevention), relapse prevention, importance of 

follow up with psychiatrist and therapist, stay away from drugs/alcohol/smoking





Estimated Date of D/C: 06/29/18

## 2018-06-18 NOTE — PCM.BM
<Yordy Blount - Last Filed: 06/18/18 11:37>





Treatment Plan Problems





- Problems identified on initial assessmt


  ** Altered thoughts


Date Initiated: 06/04/18


Time Initiated: 06:40


Assessment reference: NA


Status: Active


Priority: 2





  ** Substance abuse


Date Initiated: 06/04/18


Time Initiated: 06:41


Date resolved: 06/11/18


Assessment reference: NA


Status: Active





  ** Auditory Hallucinations


Date Initiated: 06/04/18


Time Initiated: 09:45


Date resolved: 06/11/18


Assessment reference: NA


Status: Active


Priority: 1





  ** Impaired Communication


Date Initiated: 06/04/18


Time Initiated: 09:45


Assessment reference: NA


Status: Active


Priority: 3





  ** Depressive Symptoms


Date Initiated: 06/04/18


Time Initiated: 09:46


Assessment reference: NA


Status: Active


Priority: 4





Treatment assets and liabiliti


Patient Assests: ADL independent, good support system, good past tx response, 

good interpersonal skills


Patient Liabilities: substance abuse





- Milieu Protocol


Maintain good personal hygiene: daily Encourage regular showers, daily Remind 

patient to perform daily oral care, daily Assist patient to perform ADL's


Maintain personal safety: every shift Educate patient to report safety concerns 

to staff, every shift Monitor environment for contraband/sharps


Medication safety: Monitor for expected outcome, potential side effects: every 

shift, Assess barriers to learning: every shift, Assess readiness for 

medication education: every shift


Milieu Narrative: 








* c/w current tx and plan


* geodon  60 mg a.m. and 60mg at bedtime for psychosis, will be tapered off 


* 6/16/18 Day #1 First dose of Clozaril 12.5 mg po daily given


* 6/17/18 Day #2  Clozaril increased to 25 mg po daily


* 6/18/18 Day #3  Clozaril increased to 25 mg po AMHS


* Ativan 2mg po bid for catatonia





* No new weekend labs 


* Vitals reviewed and noted below:


 Selected Entries











  06/17/18





  07:19


 


Temperature 97.9 F


 


Pulse Rate 139 H


 


Respiratory 20





Rate 


 


Blood Pressure 124/97 H








  





Family Contact


Family involvement: Family/SO is involved


Family contact: Patient agrees to contact


Family contact name: Opal Brown([phone]) sister


Family contacted how many times per week?: 2





- Goals for Treatment


Patient goals for treatment: "To just chill."





Discharge/Continuing Care





- Education Needs


Education Needs: Patient Medication, Patient Coping Skills, Patient Aftercare 

Safety Plan





- Discharge


Discharge Criteria: Free of paranoid thoughts, Normal sleep pattern





- Treatment Team Participation


Patient/Family/SO Statement: 








* c/w current tx and plan


* geodon  60 mg a.m. and 60mg at bedtime for psychosis, will be tapered off 


* 6/16/18 Day #1 First dose of Clozaril 12.5 mg po daily given


* 6/17/18 Day #2  Clozaril increased to 25 mg po daily


* 6/18/18 Day #3  Clozaril increased to 25 mg po AMHS


* Ativan 2mg po bid for catatonia





* No new weekend labs 


* Vitals reviewed and noted below:


 Selected Entries











  06/17/18





  07:19


 


Temperature 97.9 F


 


Pulse Rate 139 H


 


Respiratory 20





Rate 


 


Blood Pressure 124/97 H








  





Treatment Plan Review





- Problem


  ** Altered thoughts


Time Initiated: 06:40





  ** Substance abuse


Time Initiated: 06:41





  ** Auditory Hallucinations


Time Initiated: 09:45





  ** Impaired Communication


Time Initiated: 09:45





  ** Depressive Symptoms


Time Initiated: 09:46





<Hattie Dsouza - Last Filed: 06/18/18 14:04>





- Diagnosis


(1) Polysubstance abuse


Status: Acute   


Interventions: 





06/18/18 14:04


addressed


but pt is too psychotic 











(2) Schizophrenia


Status: Acute   


Interventions: 





06/18/18 14:04


Psychoeducation/psychotherapy


no improvement with symptomatology


Psychopharmacology/adjustment of medications as needed/ monitoring possible 

side effects


Evaluate pt on daily basis


Compliance with medications and follow up appointments


Long acting medication if pt is noncompliant with pill form


Suicide and homicide risk assessment and prevention, coping strategies, safety 

plan


Relapse prevention


Reduction of symptoms


Improve functional status


Possible assertive community treatment


Cognitive behavioral therapy


Family involvement


Possible social skill training as outpatient








<Kay Wallace - Last Filed: 06/18/18 17:01>

## 2018-06-19 NOTE — PCM.PYCHPN
Psychiatric Progress Note





- Psychiatric Progress Note


Patient seen today, length of contact: 30 minute


Patient Chief Complaint: 





"Aa, I am okay, I guess'


Problems Identified/Issues Discussed: 





Suicide/ homicide prevention, past psychiatric h/o, current psychiatric symptoms

, medical problems, risk/benefits and alternatives of medications, medications 

compliance, coping strategies, substance abuse h/o, relapse prevention, 

importance of follow up with psychiatrist and therapist, discharge plan. 


Medical Problems: 





see HPI


Diagnostic Results: 














 06/05/18 07:21 





 06/05/18 07:21 





 Lab Results





06/05/18 07:21: Sodium 146, Potassium 3.6, Chloride 106, Carbon Dioxide 23, 

Anion Gap 21 H, BUN 10, Creatinine 0.6 L, Est GFR ( Amer) > 60, Est GFR (

Non-Af Amer) > 60, Random Glucose 115 H, Calcium 9.6, Magnesium 1.9, Total 

Bilirubin 0.9, AST 65 H D, ALT 91 H, Alkaline Phosphatase 104, Total Protein 

8.5 H, Albumin 4.7, Globulin 3.8, Albumin/Globulin Ratio 1.2


06/05/18 07:21: WBC 11.9 H D, RBC 4.76, Hgb 13.5, Hct 41.5, MCV 87.2, MCH 28.4, 

MCHC 32.5, RDW 13.5, Plt Count 302, MPV 9.8, Gran % 59.6, Lymph % (Auto) 32.1, 

Mono % (Auto) 6.6 H, Eos % (Auto) 1.4 L, Baso % (Auto) 0.3, Gran # 7.09 H, 

Lymph # (Auto) 3.8 H, Mono # (Auto) 0.8 H, Eos # (Auto) 0.2, Baso # (Auto) 0.03


06/04/18 07:00: RPR Nonreactive


06/04/18 07:00: TSH 3rd Generation 1.26


06/04/18 07:00: Triglycerides 59, Cholesterol 95 L, LDL Cholesterol Direct 42, 

HDL Cholesterol 36


06/03/18 20:21: Alcohol, Quantitative < 10


06/03/18 20:21: Salicylates < 1 L, Acetaminophen < 10.0 L


06/03/18 20:21: Sodium 142, Potassium 3.5 L, Chloride 103, Carbon Dioxide 23, 

Anion Gap 19, BUN 7, Creatinine 0.5 L, Est GFR ( Amer) > 60, Est GFR (Non

-Af Amer) > 60, Random Glucose 103, Calcium 9.4, Magnesium 1.8, Total Bilirubin 

1.6 H, AST 37 H, ALT 63 H, Alkaline Phosphatase 97, Total Protein 9.0 H, 

Albumin 4.9 H, Globulin 4.1, Albumin/Globulin Ratio 1.2


06/03/18 20:21: WBC 16.3 H, RBC 4.80, Hgb 13.8, Hct 41.1, MCV 85.6, MCH 28.8, 

MCHC 33.6, RDW 13.4, Plt Count 290, MPV 9.7, Gran % 75.5 H, Lymph % (Auto) 16.2 

L, Mono % (Auto) 8.0 H, Eos % (Auto) 0.1 L, Baso % (Auto) 0.2, Gran # 12.33 H, 

Lymph # (Auto) 2.7, Mono # (Auto) 1.3 H, Eos # (Auto) 0.0, Baso # (Auto) 0.03


06/03/18 20:02: Urine Opiates Screen Positive H, Urine Methadone Screen Negative

, Ur Barbiturates Screen Negative, Ur Phencyclidine Scrn Negative, Ur 

Amphetamines Screen Negative, U Benzodiazepines Scrn Positive H, U Oth Cocaine 

Metabols Positive H, U Cannabinoids Screen Negative


06/03/18 20:02: Urine Color Yellow, Urine Appearance Clear, Urine pH 5.5, Ur 

Specific Gravity >= 1.030, Urine Protein Negative, Urine Glucose (UA) Negative, 

Urine Ketones Trace H, Urine Blood Negative, Urine Nitrate Negative, Urine 

Bilirubin Negative, Urine Urobilinogen 0.2, Ur Leukocyte Esterase Negative











Vital Signs











  Temp Pulse Resp BP Pulse Ox


 


 06/05/18 07:00  98.5 F  125 H  18  147/97 H  100


 


 06/04/18 15:00   89   126/72 


 


 06/04/18 07:33  98.5 F  84  20  133/79 


 


 06/04/18 05:45    18  


 


 06/04/18 04:03  98.2 F  87  20  133/72  98


 


 06/04/18 03:24  98.2 F  87  20  133/72  98


 


 06/04/18 01:30  99.2 F  94 H  20  146/77  99


 


 06/03/18 19:44  98.4 F  80  18  107/92 H  98














 











Temp Pulse Resp BP Pulse Ox


 


 98.0 F   144 H  20   139/82   100 


 


 06/07/18 07:15  06/07/18 07:15  06/07/18 07:15  06/07/18 07:15  06/05/18 07:00











 











Temp Pulse Resp BP Pulse Ox


 


 97.8 F   96 H  20   128/77   100 


 


 06/11/18 06:35  06/11/18 16:16  06/11/18 06:35  06/11/18 16:16  06/05/18 07:00








DSM 5 Symptoms Update: 





Shortly pt is 16yo  Female with reported history of psychosis, most 

likely schizophrenia, pt has one psych admission to [facility] last 

year, pt was initially admitted to ICU, confused and agitated, pt was having 

command type hallucinations back then, [place], "doing 

exorcism", had impression that her father was not her biological father, pt 

staid here for almost a month, after the d/c  pt was noncompliant with 

medications and follow up appts, pt was using drugs, as a result pt was confused

, was wondering on streets, was psychotic, was not able to function. Pt 

required further evaluation and stabilization, meds resumption and titration. 





patient was started on Clozaril last week Friday, patient symptoms tolerating 

that medication well, patient is on tapering dose of Geodon, patient still 

psychotic, disorganized, catatonic, constant encouragement to take a shower. 





pt was educated about importance to stay away from drugs and effect drugs on 

brain and worsening of schizophrenia.





so far patient tolerates medications well, no side effects observed or reported

, aims 0, no EPS.





Impression:


schizophrenia


r/o substance induced psychosis. 


Medication Change: Yes (Clozaril increased, Geodon decreased)


Medical Record Reviewed: Yes





Mental Status Examination





- Cognitive Function


Orientation: Person


Memory: Impaired


Attention: Poor (some improvement)


Concentration: Poor (some improvement)


Association: Loose (some improvement)


Fund of Knowledge: Poor





- Mood


Mood: Neutral





- Affect


Affect: Constricted, Flat, Other (pt was giggling inappropriately, )





- Speech


Speech: Soft





- Formal Thought Process


Formal Thought Process: Hallucinations (denied all weekend), Delusions, Paranoia

, Loosening of associations, Circumstantial





- Suicidal Ideation


Suicidal Ideation: No





- Homicidal Ideation


Homicidal Ideation: No





Goal/Treatment Plan





- Goal/Treatment Plan


Need for Continued Stay: Remain at risks for inpatient hospitalization, Severe 

depression anxiety, Discharge may exacerbated symptoms, Severe functional 

impairment


Progress Toward Problem(s) and Goals/Treatment Plan: 


Milieu/structure/supportive therapy 


Medical consult will be called for leukocytosis


seroquel was discontinued


geodon  40 mg a.m. and 40mg at bedtime for psychosis, will be tapered off


clozaril 25mg am and 50mg hs for psychosis


ativan 2mg po bid for catatonia


collaterals from family appreciated, meeting appreciated with uncle 6/15/18


SW consultation for discharge plan and social issues 


Follow up on labs 


Will monitor closely 


Pt was educated about risk/benefits and alternatives of medications, coping 

strategies (safety plan, suicide prevention), relapse prevention, importance of 

follow up with psychiatrist and therapist, stay away from drugs/alcohol/smoking





Estimated Date of D/C: 06/29/18

## 2018-06-20 NOTE — PCM.PYCHPN
Psychiatric Progress Note





- Psychiatric Progress Note


Patient seen today, length of contact: 30 minute


Patient Chief Complaint: 





"Aa, I am okay, I guess'


Problems Identified/Issues Discussed: 





Suicide/ homicide prevention, past psychiatric h/o, current psychiatric symptoms

, medical problems, risk/benefits and alternatives of medications, medications 

compliance, coping strategies, substance abuse h/o, relapse prevention, 

importance of follow up with psychiatrist and therapist, discharge plan. 


Medical Problems: 





see HPI


Diagnostic Results: 














 06/05/18 07:21 





 06/05/18 07:21 





 Lab Results





06/05/18 07:21: Sodium 146, Potassium 3.6, Chloride 106, Carbon Dioxide 23, 

Anion Gap 21 H, BUN 10, Creatinine 0.6 L, Est GFR ( Amer) > 60, Est GFR (

Non-Af Amer) > 60, Random Glucose 115 H, Calcium 9.6, Magnesium 1.9, Total 

Bilirubin 0.9, AST 65 H D, ALT 91 H, Alkaline Phosphatase 104, Total Protein 

8.5 H, Albumin 4.7, Globulin 3.8, Albumin/Globulin Ratio 1.2


06/05/18 07:21: WBC 11.9 H D, RBC 4.76, Hgb 13.5, Hct 41.5, MCV 87.2, MCH 28.4, 

MCHC 32.5, RDW 13.5, Plt Count 302, MPV 9.8, Gran % 59.6, Lymph % (Auto) 32.1, 

Mono % (Auto) 6.6 H, Eos % (Auto) 1.4 L, Baso % (Auto) 0.3, Gran # 7.09 H, 

Lymph # (Auto) 3.8 H, Mono # (Auto) 0.8 H, Eos # (Auto) 0.2, Baso # (Auto) 0.03


06/04/18 07:00: RPR Nonreactive


06/04/18 07:00: TSH 3rd Generation 1.26


06/04/18 07:00: Triglycerides 59, Cholesterol 95 L, LDL Cholesterol Direct 42, 

HDL Cholesterol 36


06/03/18 20:21: Alcohol, Quantitative < 10


06/03/18 20:21: Salicylates < 1 L, Acetaminophen < 10.0 L


06/03/18 20:21: Sodium 142, Potassium 3.5 L, Chloride 103, Carbon Dioxide 23, 

Anion Gap 19, BUN 7, Creatinine 0.5 L, Est GFR ( Amer) > 60, Est GFR (Non

-Af Amer) > 60, Random Glucose 103, Calcium 9.4, Magnesium 1.8, Total Bilirubin 

1.6 H, AST 37 H, ALT 63 H, Alkaline Phosphatase 97, Total Protein 9.0 H, 

Albumin 4.9 H, Globulin 4.1, Albumin/Globulin Ratio 1.2


06/03/18 20:21: WBC 16.3 H, RBC 4.80, Hgb 13.8, Hct 41.1, MCV 85.6, MCH 28.8, 

MCHC 33.6, RDW 13.4, Plt Count 290, MPV 9.7, Gran % 75.5 H, Lymph % (Auto) 16.2 

L, Mono % (Auto) 8.0 H, Eos % (Auto) 0.1 L, Baso % (Auto) 0.2, Gran # 12.33 H, 

Lymph # (Auto) 2.7, Mono # (Auto) 1.3 H, Eos # (Auto) 0.0, Baso # (Auto) 0.03


06/03/18 20:02: Urine Opiates Screen Positive H, Urine Methadone Screen Negative

, Ur Barbiturates Screen Negative, Ur Phencyclidine Scrn Negative, Ur 

Amphetamines Screen Negative, U Benzodiazepines Scrn Positive H, U Oth Cocaine 

Metabols Positive H, U Cannabinoids Screen Negative


06/03/18 20:02: Urine Color Yellow, Urine Appearance Clear, Urine pH 5.5, Ur 

Specific Gravity >= 1.030, Urine Protein Negative, Urine Glucose (UA) Negative, 

Urine Ketones Trace H, Urine Blood Negative, Urine Nitrate Negative, Urine 

Bilirubin Negative, Urine Urobilinogen 0.2, Ur Leukocyte Esterase Negative











Vital Signs











  Temp Pulse Resp BP Pulse Ox


 


 06/05/18 07:00  98.5 F  125 H  18  147/97 H  100


 


 06/04/18 15:00   89   126/72 


 


 06/04/18 07:33  98.5 F  84  20  133/79 


 


 06/04/18 05:45    18  


 


 06/04/18 04:03  98.2 F  87  20  133/72  98


 


 06/04/18 03:24  98.2 F  87  20  133/72  98


 


 06/04/18 01:30  99.2 F  94 H  20  146/77  99


 


 06/03/18 19:44  98.4 F  80  18  107/92 H  98














 











Temp Pulse Resp BP Pulse Ox


 


 98.0 F   144 H  20   139/82   100 


 


 06/07/18 07:15  06/07/18 07:15  06/07/18 07:15  06/07/18 07:15  06/05/18 07:00











 











Temp Pulse Resp BP Pulse Ox


 


 97.8 F   96 H  20   128/77   100 


 


 06/11/18 06:35  06/11/18 16:16  06/11/18 06:35  06/11/18 16:16  06/05/18 07:00








DSM 5 Symptoms Update: 





Shortly pt is 16yo  Female with reported history of psychosis, most 

likely schizophrenia, pt has one psych admission to [facility] last 

year, pt was initially admitted to ICU, confused and agitated, pt was having 

command type hallucinations back then, was found [place] the train station, "doing 

exorcism", had impression that her father was not her biological father, pt 

staid here for almost a month, after the d/c  pt was noncompliant with 

medications and follow up appts, pt was using drugs, as a result pt was confused

, was wondering on streets, was psychotic, was not able to function. Pt 

required further evaluation and stabilization, meds resumption and titration. 





patient was started on Clozaril last week Friday, patient symptoms tolerating 

that medication well, patient is on tapering dose of Geodon, patient still 

psychotic, but more organized, catatonia improving, pt is weird, flat affect, 

does not make much sense. 





pt was educated about importance to stay away from drugs and effect drugs on 

brain and worsening of schizophrenia.





so far patient tolerates medications well, no side effects observed or reported

, aims 0, no EPS.





Impression:


schizophrenia


r/o substance induced psychosis. 


Medication Change: Yes (Clozaril increased, Geodon decreased)


Medical Record Reviewed: Yes





Mental Status Examination





- Cognitive Function


Orientation: Person


Memory: Impaired


Attention: Poor (some improvement)


Concentration: Poor (some improvement)


Association: Loose (some improvement)


Fund of Knowledge: Poor





- Mood


Mood: Neutral





- Affect


Affect: Constricted, Flat, Other (pt was giggling inappropriately, )





- Speech


Speech: Soft





- Formal Thought Process


Formal Thought Process: Hallucinations (denied all weekend), Delusions, Paranoia

, Loosening of associations, Circumstantial





- Suicidal Ideation


Suicidal Ideation: No





- Homicidal Ideation


Homicidal Ideation: No





Goal/Treatment Plan





- Goal/Treatment Plan


Need for Continued Stay: Remain at risks for inpatient hospitalization, Severe 

depression anxiety, Discharge may exacerbated symptoms, Severe functional 

impairment


Progress Toward Problem(s) and Goals/Treatment Plan: 


Milieu/structure/supportive therapy 


Medical consult will be called for leukocytosis


seroquel was discontinued


geodon  40 mg a.m. and 40mg at bedtime for psychosis, will be tapered off


clozaril 50mg am and 50mg hs for psychosis


ativan 2mg po bid for catatonia


collaterals from family appreciated, meeting appreciated with uncle 6/15/18


SW consultation for discharge plan and social issues 


Follow up on labs 


Will monitor closely 


Pt was educated about risk/benefits and alternatives of medications, coping 

strategies (safety plan, suicide prevention), relapse prevention, importance of 

follow up with psychiatrist and therapist, stay away from drugs/alcohol/smoking





Estimated Date of D/C: 06/29/18

## 2018-06-21 NOTE — PCM.PYCHPN
Psychiatric Progress Note





- Psychiatric Progress Note


Patient seen today, length of contact: 30 minute


Patient Chief Complaint: 





"Aa, I am okay, I guess'


Problems Identified/Issues Discussed: 





Suicide/ homicide prevention, past psychiatric h/o, current psychiatric symptoms

, medical problems, risk/benefits and alternatives of medications, medications 

compliance, coping strategies, substance abuse h/o, relapse prevention, 

importance of follow up with psychiatrist and therapist, discharge plan. 


Medical Problems: 





see HPI


Diagnostic Results: 














 06/05/18 07:21 





 06/05/18 07:21 





 Lab Results





06/05/18 07:21: Sodium 146, Potassium 3.6, Chloride 106, Carbon Dioxide 23, 

Anion Gap 21 H, BUN 10, Creatinine 0.6 L, Est GFR ( Amer) > 60, Est GFR (

Non-Af Amer) > 60, Random Glucose 115 H, Calcium 9.6, Magnesium 1.9, Total 

Bilirubin 0.9, AST 65 H D, ALT 91 H, Alkaline Phosphatase 104, Total Protein 

8.5 H, Albumin 4.7, Globulin 3.8, Albumin/Globulin Ratio 1.2


06/05/18 07:21: WBC 11.9 H D, RBC 4.76, Hgb 13.5, Hct 41.5, MCV 87.2, MCH 28.4, 

MCHC 32.5, RDW 13.5, Plt Count 302, MPV 9.8, Gran % 59.6, Lymph % (Auto) 32.1, 

Mono % (Auto) 6.6 H, Eos % (Auto) 1.4 L, Baso % (Auto) 0.3, Gran # 7.09 H, 

Lymph # (Auto) 3.8 H, Mono # (Auto) 0.8 H, Eos # (Auto) 0.2, Baso # (Auto) 0.03


06/04/18 07:00: RPR Nonreactive


06/04/18 07:00: TSH 3rd Generation 1.26


06/04/18 07:00: Triglycerides 59, Cholesterol 95 L, LDL Cholesterol Direct 42, 

HDL Cholesterol 36


06/03/18 20:21: Alcohol, Quantitative < 10


06/03/18 20:21: Salicylates < 1 L, Acetaminophen < 10.0 L


06/03/18 20:21: Sodium 142, Potassium 3.5 L, Chloride 103, Carbon Dioxide 23, 

Anion Gap 19, BUN 7, Creatinine 0.5 L, Est GFR ( Amer) > 60, Est GFR (Non

-Af Amer) > 60, Random Glucose 103, Calcium 9.4, Magnesium 1.8, Total Bilirubin 

1.6 H, AST 37 H, ALT 63 H, Alkaline Phosphatase 97, Total Protein 9.0 H, 

Albumin 4.9 H, Globulin 4.1, Albumin/Globulin Ratio 1.2


06/03/18 20:21: WBC 16.3 H, RBC 4.80, Hgb 13.8, Hct 41.1, MCV 85.6, MCH 28.8, 

MCHC 33.6, RDW 13.4, Plt Count 290, MPV 9.7, Gran % 75.5 H, Lymph % (Auto) 16.2 

L, Mono % (Auto) 8.0 H, Eos % (Auto) 0.1 L, Baso % (Auto) 0.2, Gran # 12.33 H, 

Lymph # (Auto) 2.7, Mono # (Auto) 1.3 H, Eos # (Auto) 0.0, Baso # (Auto) 0.03


06/03/18 20:02: Urine Opiates Screen Positive H, Urine Methadone Screen Negative

, Ur Barbiturates Screen Negative, Ur Phencyclidine Scrn Negative, Ur 

Amphetamines Screen Negative, U Benzodiazepines Scrn Positive H, U Oth Cocaine 

Metabols Positive H, U Cannabinoids Screen Negative


06/03/18 20:02: Urine Color Yellow, Urine Appearance Clear, Urine pH 5.5, Ur 

Specific Gravity >= 1.030, Urine Protein Negative, Urine Glucose (UA) Negative, 

Urine Ketones Trace H, Urine Blood Negative, Urine Nitrate Negative, Urine 

Bilirubin Negative, Urine Urobilinogen 0.2, Ur Leukocyte Esterase Negative











Vital Signs











  Temp Pulse Resp BP Pulse Ox


 


 06/05/18 07:00  98.5 F  125 H  18  147/97 H  100


 


 06/04/18 15:00   89   126/72 


 


 06/04/18 07:33  98.5 F  84  20  133/79 


 


 06/04/18 05:45    18  


 


 06/04/18 04:03  98.2 F  87  20  133/72  98


 


 06/04/18 03:24  98.2 F  87  20  133/72  98


 


 06/04/18 01:30  99.2 F  94 H  20  146/77  99


 


 06/03/18 19:44  98.4 F  80  18  107/92 H  98














 











Temp Pulse Resp BP Pulse Ox


 


 98.0 F   144 H  20   139/82   100 


 


 06/07/18 07:15  06/07/18 07:15  06/07/18 07:15  06/07/18 07:15  06/05/18 07:00











 











Temp Pulse Resp BP Pulse Ox


 


 97.8 F   96 H  20   128/77   100 


 


 06/11/18 06:35  06/11/18 16:16  06/11/18 06:35  06/11/18 16:16  06/05/18 07:00








DSM 5 Symptoms Update: 


Shortly pt is 16yo  Female with reported history of psychosis, most 

likely schizophrenia, pt has one psych admission to [facility] last 

year, pt was initially admitted to ICU, confused and agitated, pt was having 

command type hallucinations back then, [place] the [place], "doing 

exorcism", had impression that her father was not her biological father, pt 

staid here for almost a month, after the d/c  pt was noncompliant with 

medications and follow up appts, pt was using drugs, as a result pt was confused

, was wondering on streets, was psychotic, was not able to function. Pt 

required further evaluation and stabilization, meds resumption and titration. 





patient was started on Clozaril last week Friday, patient symptoms tolerating 

that medication well, patient is on tapering dose of Geodon, patient still 

psychotic, but more organized, catatonia improving, pt is weird, flat affect, 

does not make much sense. today patient reported to hear voices "a good and bad

, but it is my own voice".





pt was educated about importance to stay away from drugs and effect drugs on 

brain and worsening of schizophrenia.





so far patient tolerates medications well, no side effects observed or reported

, aims 0, no EPS.





Impression:


schizophrenia


r/o substance induced psychosis. 





Medication Change: Yes (Clozaril increased, Geodon decreased)


Medical Record Reviewed: Yes





Mental Status Examination





- Cognitive Function


Orientation: Person


Memory: Impaired


Attention: Poor (some improvement)


Concentration: Poor (some improvement)


Association: Loose (some improvement)


Fund of Knowledge: Poor





- Mood


Mood: Neutral





- Affect


Affect: Constricted, Flat, Other (pt was giggling inappropriately, )





- Speech


Speech: Soft





- Formal Thought Process


Formal Thought Process: Hallucinations (denied all weekend), Delusions, Paranoia

, Loosening of associations, Circumstantial





- Suicidal Ideation


Suicidal Ideation: No





- Homicidal Ideation


Homicidal Ideation: No





Goal/Treatment Plan





- Goal/Treatment Plan


Need for Continued Stay: Remain at risks for inpatient hospitalization, Severe 

depression anxiety, Discharge may exacerbated symptoms, Severe functional 

impairment


Progress Toward Problem(s) and Goals/Treatment Plan: 


Milieu/structure/supportive therapy 


Medical consult will be called for leukocytosis


seroquel was discontinued


geodon  20 mg a.m. and 20mg at bedtime for psychosis, will be tapered off


clozaril 50mg am and 75mg hs for psychosis


ativan 2mg po bid for catatonia


collaterals from family appreciated, meeting appreciated with uncle 6/15/18


 consultation for discharge plan and social issues 


Follow up on labs 


Will monitor closely 


Pt was educated about risk/benefits and alternatives of medications, coping 

strategies (safety plan, suicide prevention), relapse prevention, importance of 

follow up with psychiatrist and therapist, stay away from drugs/alcohol/smoking





Estimated Date of D/C: 06/29/18

## 2018-06-22 LAB
BASOPHILS # BLD AUTO: 0.03 K/MM3 (ref 0–2)
BASOPHILS NFR BLD: 0.3 % (ref 0–3)
EOSINOPHIL # BLD: 0.2 10*3/UL (ref 0–0.7)
EOSINOPHIL NFR BLD: 2 % (ref 1.5–5)
ERYTHROCYTE [DISTWIDTH] IN BLOOD BY AUTOMATED COUNT: 13 % (ref 11.5–14.5)
GRANULOCYTES # BLD: 7.08 10*3/UL (ref 1.4–6.5)
GRANULOCYTES NFR BLD: 60.4 % (ref 50–68)
HGB BLD-MCNC: 13.1 G/DL (ref 12–16)
LYMPHOCYTES # BLD: 3.5 10*3/UL (ref 1.2–3.4)
LYMPHOCYTES NFR BLD AUTO: 30.1 % (ref 22–35)
MCH RBC QN AUTO: 28.4 PG (ref 25–35)
MCHC RBC AUTO-ENTMCNC: 33 G/DL (ref 31–37)
MCV RBC AUTO: 86.1 FL (ref 80–105)
MONOCYTES # BLD AUTO: 0.8 10*3/UL (ref 0.1–0.6)
MONOCYTES NFR BLD: 7.2 % (ref 1–6)
PLATELET # BLD: 326 10^3/UL (ref 120–450)
PMV BLD AUTO: 9.2 FL (ref 7–11)
RBC # BLD AUTO: 4.61 10^6/UL (ref 3.5–6.1)
WBC # BLD AUTO: 11.7 10^3/UL (ref 4.5–11)

## 2018-06-22 NOTE — PCM.PYCHPN
Psychiatric Progress Note





- Psychiatric Progress Note


Patient seen today, length of contact: 30 minute


Patient Chief Complaint: 





"Doctor said I am leaving today..."


Problems Identified/Issues Discussed: 





Suicide/ homicide prevention, past psychiatric h/o, current psychiatric symptoms

, medical problems, risk/benefits and alternatives of medications, medications 

compliance, coping strategies, substance abuse h/o, relapse prevention, 

importance of follow up with psychiatrist and therapist, discharge plan. 


Medical Problems: 





see HPI


Diagnostic Results: 














 06/05/18 07:21 





 06/05/18 07:21 





 Lab Results





06/05/18 07:21: Sodium 146, Potassium 3.6, Chloride 106, Carbon Dioxide 23, 

Anion Gap 21 H, BUN 10, Creatinine 0.6 L, Est GFR ( Amer) > 60, Est GFR (

Non-Af Amer) > 60, Random Glucose 115 H, Calcium 9.6, Magnesium 1.9, Total 

Bilirubin 0.9, AST 65 H D, ALT 91 H, Alkaline Phosphatase 104, Total Protein 

8.5 H, Albumin 4.7, Globulin 3.8, Albumin/Globulin Ratio 1.2


06/05/18 07:21: WBC 11.9 H D, RBC 4.76, Hgb 13.5, Hct 41.5, MCV 87.2, MCH 28.4, 

MCHC 32.5, RDW 13.5, Plt Count 302, MPV 9.8, Gran % 59.6, Lymph % (Auto) 32.1, 

Mono % (Auto) 6.6 H, Eos % (Auto) 1.4 L, Baso % (Auto) 0.3, Gran # 7.09 H, 

Lymph # (Auto) 3.8 H, Mono # (Auto) 0.8 H, Eos # (Auto) 0.2, Baso # (Auto) 0.03


06/04/18 07:00: RPR Nonreactive


06/04/18 07:00: TSH 3rd Generation 1.26


06/04/18 07:00: Triglycerides 59, Cholesterol 95 L, LDL Cholesterol Direct 42, 

HDL Cholesterol 36


06/03/18 20:21: Alcohol, Quantitative < 10


06/03/18 20:21: Salicylates < 1 L, Acetaminophen < 10.0 L


06/03/18 20:21: Sodium 142, Potassium 3.5 L, Chloride 103, Carbon Dioxide 23, 

Anion Gap 19, BUN 7, Creatinine 0.5 L, Est GFR ( Amer) > 60, Est GFR (Non

-Af Amer) > 60, Random Glucose 103, Calcium 9.4, Magnesium 1.8, Total Bilirubin 

1.6 H, AST 37 H, ALT 63 H, Alkaline Phosphatase 97, Total Protein 9.0 H, 

Albumin 4.9 H, Globulin 4.1, Albumin/Globulin Ratio 1.2


06/03/18 20:21: WBC 16.3 H, RBC 4.80, Hgb 13.8, Hct 41.1, MCV 85.6, MCH 28.8, 

MCHC 33.6, RDW 13.4, Plt Count 290, MPV 9.7, Gran % 75.5 H, Lymph % (Auto) 16.2 

L, Mono % (Auto) 8.0 H, Eos % (Auto) 0.1 L, Baso % (Auto) 0.2, Gran # 12.33 H, 

Lymph # (Auto) 2.7, Mono # (Auto) 1.3 H, Eos # (Auto) 0.0, Baso # (Auto) 0.03


06/03/18 20:02: Urine Opiates Screen Positive H, Urine Methadone Screen Negative

, Ur Barbiturates Screen Negative, Ur Phencyclidine Scrn Negative, Ur 

Amphetamines Screen Negative, U Benzodiazepines Scrn Positive H, U Oth Cocaine 

Metabols Positive H, U Cannabinoids Screen Negative


06/03/18 20:02: Urine Color Yellow, Urine Appearance Clear, Urine pH 5.5, Ur 

Specific Gravity >= 1.030, Urine Protein Negative, Urine Glucose (UA) Negative, 

Urine Ketones Trace H, Urine Blood Negative, Urine Nitrate Negative, Urine 

Bilirubin Negative, Urine Urobilinogen 0.2, Ur Leukocyte Esterase Negative











Vital Signs











  Temp Pulse Resp BP Pulse Ox


 


 06/05/18 07:00  98.5 F  125 H  18  147/97 H  100


 


 06/04/18 15:00   89   126/72 


 


 06/04/18 07:33  98.5 F  84  20  133/79 


 


 06/04/18 05:45    18  


 


 06/04/18 04:03  98.2 F  87  20  133/72  98


 


 06/04/18 03:24  98.2 F  87  20  133/72  98


 


 06/04/18 01:30  99.2 F  94 H  20  146/77  99


 


 06/03/18 19:44  98.4 F  80  18  107/92 H  98














 











Temp Pulse Resp BP Pulse Ox


 


 98.0 F   144 H  20   139/82   100 


 


 06/07/18 07:15  06/07/18 07:15  06/07/18 07:15  06/07/18 07:15  06/05/18 07:00











 











Temp Pulse Resp BP Pulse Ox


 


 97.8 F   96 H  20   128/77   100 


 


 06/11/18 06:35  06/11/18 16:16  06/11/18 06:35  06/11/18 16:16  06/05/18 07:00











 Laboratory Results - last 24 hr











  06/22/18





  07:30


 


WBC  11.7 H


 


RBC  4.61


 


Hgb  13.1


 


Hct  39.7


 


MCV  86.1


 


MCH  28.4


 


MCHC  33.0


 


RDW  13.0


 


Plt Count  326


 


MPV  9.2


 


Gran %  60.4


 


Lymph % (Auto)  30.1


 


Mono % (Auto)  7.2 H


 


Eos % (Auto)  2.0


 


Baso % (Auto)  0.3


 


Gran #  7.08 H


 


Lymph # (Auto)  3.5 H


 


Mono # (Auto)  0.8 H


 


Eos # (Auto)  0.2


 


Baso # (Auto)  0.03








no signs of agranulocytosis, Clozaril [facility] was contacted and Gran# submitted 6/ 22/18


DSM 5 Symptoms Update: 





Shortly pt is 16yo  Female with reported history of psychosis, most 

likely schizophrenia, pt has one psych admission to [facility] last 

year, pt was initially admitted to ICU, confused and agitated, pt was having 

command type hallucinations back then, was found [place] the train station, "doing 

exorcism", had impression that her father was not her biological father, pt 

staid here for almost a month, after the d/c  pt was noncompliant with 

medications and follow up appts, pt was using drugs, as a result pt was confused

, was wondering on streets, was psychotic, was not able to function. Pt 

required further evaluation and stabilization, meds resumption and titration. 





patient was started on Clozaril 6/15/18, patient tolerating that medication well

, d/c Geodon today, patient still psychotic, but more organized, pt was 

observed next to the nursing station saying "doctor said I am leaving today", 

which is not true. 





catatonia improving, affect was more reactive, bu pt is weird, flat affect, 

does not make much sense. today patient reported to hear voices "a good and bad

, but it is my own voice".





so far patient tolerates medications well, no side effects observed or reported

, aims 0, no EPS.





Impression:


schizophrenia


r/o substance induced psychosis. 


Medication Change: Yes (Clozaril increased, Geodon decreased)


Medical Record Reviewed: Yes





Mental Status Examination





- Cognitive Function


Orientation: Person


Memory: Impaired


Attention: Poor (some improvement)


Concentration: Poor (some improvement)


Association: Loose (some improvement)


Fund of Knowledge: Poor





- Mood


Mood: Neutral





- Affect


Affect: Constricted, Flat, Other (pt was giggling inappropriately, )





- Speech


Speech: Soft





- Formal Thought Process


Formal Thought Process: Hallucinations ("I hea bad and good voices"), Delusions

, Paranoia, Loosening of associations, Circumstantial





- Suicidal Ideation


Suicidal Ideation: No





- Homicidal Ideation


Homicidal Ideation: No





Goal/Treatment Plan





- Goal/Treatment Plan


Need for Continued Stay: Remain at risks for inpatient hospitalization, Severe 

depression anxiety, Discharge may exacerbated symptoms, Severe functional 

impairment


Progress Toward Problem(s) and Goals/Treatment Plan: 


Milieu/structure/supportive therapy 


Medical consult will be called for leukocytosis


seroquel was discontinued


geodon  d/c


clozaril 50mg am and 100mg hs for psychosis


ativan 2mg po bid for catatonia


collaterals from family appreciated, meeting appreciated with uncle 6/15/18


SW consultation for discharge plan and social issues 


Follow up on labs 


Will monitor closely 


Pt was educated about risk/benefits and alternatives of medications, coping 

strategies (safety plan, suicide prevention), relapse prevention, importance of 

follow up with psychiatrist and therapist, stay away from drugs/alcohol/smoking





Estimated Date of D/C: 06/29/18

## 2018-06-23 NOTE — PCM.PYCHPN
Psychiatric Progress Note





- Psychiatric Progress Note


Patient seen today, length of contact: 30 minutes


Problems Identified/Issues Discussed: 


 I have reviewed assessment. Patient is 18yo  Female with history of 

psychosis and drug use, noncompliance with medications and outpatient treatment 

at [facility] who was admitted to the psychiatric unit with symptoms of active 

hallucinations and disorganization in context of opiate, cocaine and benzo use.

  





I reviewed recent notes and met with patient at bedside. She is calm and 

superficially cooperative with questioning. Oriented x3. Indicates that she is 

doing "okay, better I guess". Tolerating titration of clozaril and she denies 

any side effects. Her affect is flat and preoccupied. She initially endorses 

hallucinations "sometimes" but then denies having them  when asked to 

elaborate. Her responses are brief and relevant to questioning, she was not 

observed to be responding to internal stimuli during my visit.  Denies any new 

discomfort or pain. 





Patient denies any side effects from her medications or any new discomfort/

pain. She continues to agree to increase of clozaril tomorrow. 





Staff notes indicate that patient has been improving a little though still 

remains a little oddly related, flat and disorganized and oddly related on the 

unit. Speech is still underproductive at times. There were no behavioral issues 

over the weekend.    





  


 


Diagnostic Results: 


schizophrenia


r/o substance induced psychosis.








Medication Change: Yes (Clozaril increased, Geodon decreased)


Medical Record Reviewed: Yes





Mental Status Examination





- Cognitive Function


Orientation: Person


Memory: Impaired


Attention: Poor (some improvement)


Concentration: Poor (some improvement)


Association: Loose (some improvement)


Fund of Knowledge: Poor





- Mood


Mood: Neutral





- Affect


Affect: Constricted, Flat, Other (pt was giggling inappropriately, )





- Speech


Speech: Soft





- Formal Thought Process


Formal Thought Process: Hallucinations ("I hea bad and good voices"), Delusions

, Paranoia, Loosening of associations, Circumstantial





- Suicidal Ideation


Suicidal Ideation: No





- Homicidal Ideation


Homicidal Ideation: No





Goal/Treatment Plan





- Goal/Treatment Plan


Need for Continued Stay: Remain at risks for inpatient hospitalization, Severe 

depression anxiety, Discharge may exacerbated symptoms, Severe functional 

impairment


Progress Toward Problem(s) and Goals/Treatment Plan: 








* c/w current tx and plan


* 6/21/18 Clozaril 50/75 given


* 6/22/18  Clozaril 50/100 given  


* 6/23/18  Clozaril 50/100 scheduled  


* 6/24/18  Clozaril 100/100 to be increased if patient continues to tolerate 50/

100  


* Ativan 2mg po bid for catatonia





* No new weekend labs thus far


* Vitals reviewed and noted below:


 Selected Entries











  06/22/18 06/22/18





  07:20 16:24


 


Temperature 97.8 F 98.3 F


 


Pulse Rate 114 H 


 


Respiratory 19 20





Rate  


 


Blood Pressure 117/72 132/86








  


Estimated Date of D/C: 06/29/18

## 2018-06-24 NOTE — PCM.PYCHPN
Psychiatric Progress Note





- Psychiatric Progress Note


Patient seen today, length of contact: 30 minutes


Patient Chief Complaint: 


"okay, better but I feel paranoid sometimes"


Problems Identified/Issues Discussed: 


 I have reviewed assessment. Patient is 18 yo  Female with history of 

psychosis and drug use, noncompliance with medications and outpatient treatment 

at [facility] who was admitted to the psychiatric unit with symptoms of active 

hallucinations and disorganization in context of opiate, cocaine and benzo use.

  





I reviewed recent notes and met with patient at bedside again. She is calm and 

superficially cooperative with questioning. Oriented x3. Indicates that she is 

doing "okay, better I guess". Tolerating titration of clozaril and she denies 

any side effects. Her affect is flat and preoccupied. She denied having any 

hallucinations today but admits to "feeling paranoid sometimes". Her responses 

are brief and relevant to questioning, she was not observed to be responding to 

internal stimuli during my visit.  Denies any new discomfort or pain. 





Patient denies any side effects from her medications or any new discomfort/

pain. She continues to agree to further increase of clozaril today.  





Staff notes indicate that patient has been improving a little though still 

remains a little oddly related, flat and disorganized and oddly related on the 

unit. Speech is still underproductive at times. There were no behavioral issues 

over the weekend.    





  


Diagnostic Results: 


schizophrenia


r/o substance induced psychosis.








Medication Change: Yes (Clozaril increased, Geodon decreased)


Medical Record Reviewed: Yes





Mental Status Examination





- Cognitive Function


Orientation: Person, Place


Memory: Impaired


Attention: Poor (some improvement)


Concentration: Poor (some improvement)


Association: Loose (some improvement)


Fund of Knowledge: Poor





- Mood


Mood: Neutral





- Affect


Affect: Constricted, Flat, Other (pt was giggling inappropriately, )





- Speech


Speech: Soft





- Formal Thought Process


Formal Thought Process: Hallucinations (denies today), Delusions, Paranoia (

endorses), Loosening of associations, Circumstantial





- Suicidal Ideation


Suicidal Ideation: No





- Homicidal Ideation


Homicidal Ideation: No





Goal/Treatment Plan





- Goal/Treatment Plan


Need for Continued Stay: Remain at risks for inpatient hospitalization, Severe 

depression anxiety, Discharge may exacerbated symptoms, Severe functional 

impairment


Progress Toward Problem(s) and Goals/Treatment Plan: 








* c/w current tx and plan


* 6/21/18 Clozaril 50/75 given


* 6/22/18  Clozaril 50/100 given  


* 6/23/18  Clozaril 50/100 given


* 6/24/18  Clozaril 100/100 increased  


* Ativan 2mg po bid for catatonia





* No new weekend labs  


* Vitals reviewed and noted below:


 Selected Entries











  06/23/18 06/23/18 06/23/18





  07:19 07:25 16:00


 


Temperature 97.8 F 97.8 F 


 


Pulse Rate 99 H 99 H 133 H


 


Respiratory 20 20 





Rate   


 


Blood Pressure 123/78 123/78 121/73








  


Estimated Date of D/C: 06/29/18

## 2018-06-25 NOTE — PCM.BM
Treatment Plan Problems





- Problems identified on initial assessmt


  ** Altered thoughts


Date Initiated: 06/04/18


Time Initiated: 06:40


Date resolved: 06/25/18


Assessment reference: NA


Status: Active


Priority: 2





  ** Substance abuse


Date Initiated: 06/04/18


Time Initiated: 06:41


Date resolved: 06/11/18


Assessment reference: NA


Status: Active





  ** Auditory Hallucinations


Date Initiated: 06/04/18


Time Initiated: 09:45


Date resolved: 06/11/18


Assessment reference: NA


Status: Active


Priority: 1





  ** Impaired Communication


Date Initiated: 06/04/18 (6/25 SPEECH REMAIN MONOSYLLABIC,ISOLATIVE)


Time Initiated: 09:45


Assessment reference: NA


Status: Active


Priority: 3





  ** Depressive Symptoms


Date Initiated: 06/04/18 (MOOD AND AFFECT REMAIN FLAT,INAPPROPRIATE AT TIMES)


Time Initiated: 09:46


Assessment reference: NA


Status: Active


Priority: 4





Treatment assets and liabiliti


Patient Assests: ADL independent, good support system, good past tx response, 

good interpersonal skills


Patient Liabilities: substance abuse





- Milieu Protocol


Maintain good personal hygiene: daily Encourage regular showers, daily Remind 

patient to perform daily oral care, daily Assist patient to perform ADL's


Maintain personal safety: every shift Educate patient to report safety concerns 

to staff, every shift Monitor environment for contraband/sharps


Medication safety: Monitor for expected outcome, potential side effects: every 

shift, Assess barriers to learning: every shift, Assess readiness for 

medication education: every shift


Milieu Narrative: 


Milieu/structure/supportive therapy 


Medical consult will be called for leukocytosis


seroquel was discontinued


geodon  d/c


clozaril 50mg am and 200mg hs for psychosis


ativan 2mg po bid for catatonia


collaterals from family appreciated, meeting appreciated with uncle 6/15/18


 consultation for discharge plan and social issues 


Follow up on labs 


Will monitor closely 


Pt was educated about risk/benefits and alternatives of medications, coping 

strategies (safety plan, suicide prevention), relapse prevention, importance of 

follow up with psychiatrist and therapist, stay away from drugs/alcohol/smoking








Family Contact


Family contact: Patient agrees to contact


Family contact name: Opal Brown([phone]) sister


Family contacted how many times per week?: 2





- Goals for Treatment


Patient goals for treatment: "To just chill."





Discharge/Continuing Care





- Education Needs


Education Needs: Patient Medication, Patient Coping Skills, Patient Aftercare 

Safety Plan





- Discharge


Discharge Criteria: Free of paranoid thoughts, Normal sleep pattern





- Treatment Team Participation


Patient/Family/SO Statement: 


Milieu/structure/supportive therapy 


Medical consult will be called for leukocytosis


seroquel was discontinued


geodon  d/c


clozaril 50mg am and 200mg hs for psychosis


ativan 2mg po bid for catatonia


collaterals from family appreciated, meeting appreciated with uncle 6/15/18


SW consultation for discharge plan and social issues 


Follow up on labs 


Will monitor closely 


Pt was educated about risk/benefits and alternatives of medications, coping 

strategies (safety plan, suicide prevention), relapse prevention, importance of 

follow up with psychiatrist and therapist, stay away from drugs/alcohol/smoking








Treatment Plan Review





- Problem


  ** Altered thoughts


Time Initiated: 06:40





  ** Substance abuse


Time Initiated: 06:41





  ** Auditory Hallucinations


Time Initiated: 09:45





  ** Impaired Communication


Time Initiated: 09:45





  ** Depressive Symptoms


Time Initiated: 09:46

## 2018-06-25 NOTE — PCM.BM
<Kay Wallace Y - Last Filed: 06/25/18 16:26>





Treatment Plan Problems





- Problems identified on initial assessmt


  ** Altered thoughts


Date Initiated: 06/04/18


Time Initiated: 06:40


Assessment reference: NA


Status: Active


Priority: 2





  ** Substance abuse


Date Initiated: 06/04/18


Time Initiated: 06:41


Date resolved: 06/11/18


Assessment reference: NA


Status: Active





  ** Auditory Hallucinations


Date Initiated: 06/04/18


Time Initiated: 09:45


Date resolved: 06/11/18


Assessment reference: NA


Status: Active


Priority: 1





  ** Impaired Communication


Date Initiated: 06/04/18


Time Initiated: 09:45


Assessment reference: NA


Status: Active


Priority: 3





  ** Depressive Symptoms


Date Initiated: 06/04/18


Time Initiated: 09:46


Assessment reference: NA


Status: Active


Priority: 4





Treatment assets and liabiliti


Patient Assests: ADL independent, good support system, good past tx response, 

good interpersonal skills


Patient Liabilities: substance abuse





- Milieu Protocol


Maintain good personal hygiene: daily Encourage regular showers, daily Remind 

patient to perform daily oral care, daily Assist patient to perform ADL's


Maintain personal safety: every shift Educate patient to report safety concerns 

to staff, every shift Monitor environment for contraband/sharps


Medication safety: Monitor for expected outcome, potential side effects: every 

shift, Assess barriers to learning: every shift, Assess readiness for 

medication education: every shift


Milieu Narrative: 








* c/w current tx and plan


* 6/21/18 Clozaril 50/75 given


* 6/22/18  Clozaril 50/100 given  


* 6/23/18  Clozaril 50/100 given


* 6/24/18  Clozaril 100/100 increased  


* Ativan 2mg po bid for catatonia





* No new weekend labs  


* Vitals reviewed and noted below:


 Selected Entries











  06/23/18 06/23/18 06/23/18





  07:19 07:25 16:00


 


Temperature 97.8 F 97.8 F 


 


Pulse Rate 99 H 99 H 133 H


 


Respiratory 20 20 





Rate   


 


Blood Pressure 123/78 123/78 121/73








  





Family Contact


Family contact: Patient agrees to contact


Family contact name: Opal Brown([phone]) sister


Family contacted how many times per week?: 2





- Goals for Treatment


Patient goals for treatment: "To just chill."





Discharge/Continuing Care





- Education Needs


Education Needs: Patient Medication, Patient Coping Skills, Patient Aftercare 

Safety Plan





- Discharge


Discharge Criteria: Free of paranoid thoughts, Normal sleep pattern





- Treatment Team Participation


Patient/Family/SO Statement: 








* c/w current tx and plan


* 6/21/18 Clozaril 50/75 given


* 6/22/18  Clozaril 50/100 given  


* 6/23/18  Clozaril 50/100 given


* 6/24/18  Clozaril 100/100 increased  


* Ativan 2mg po bid for catatonia





* No new weekend labs  


* Vitals reviewed and noted below:


 Selected Entries











  06/23/18 06/23/18 06/23/18





  07:19 07:25 16:00


 


Temperature 97.8 F 97.8 F 


 


Pulse Rate 99 H 99 H 133 H


 


Respiratory 20 20 





Rate   


 


Blood Pressure 123/78 123/78 121/73








  





Treatment Plan Review





- Problem


  ** Altered thoughts


Time Initiated: 06:40





  ** Substance abuse


Time Initiated: 06:41





  ** Auditory Hallucinations


Time Initiated: 09:45





  ** Impaired Communication


Time Initiated: 09:45





  ** Depressive Symptoms


Time Initiated: 09:46





<Hattie Dsouza - Last Filed: 06/25/18 17:32>





- Diagnosis


(1) Polysubstance abuse


Status: Acute   


Interventions: 





06/25/18 17:31


pt is educated about drugs


pt expressed no interest to resume drugs. 











(2) Schizophrenia


Status: Acute   


Interventions: 





06/25/18 17:31


pt still psychotic and disorganized, on clozaril protocol

## 2018-06-25 NOTE — PCM.PYCHPN
Psychiatric Progress Note





- Psychiatric Progress Note


Patient seen today, length of contact: 30 minutes


Patient Chief Complaint: 





"I do not hear any voices, I feel better, I am more calm".


Problems Identified/Issues Discussed: 





Suicide/ homicide prevention, past psychiatric h/o, current psychiatric symptoms

, medical problems, risk/benefits and alternatives of medications, medications 

compliance, coping strategies, substance abuse h/o, relapse prevention, 

importance of follow up with psychiatrist and therapist, discharge plan. 


Medical Problems: 





see HPI


Diagnostic Results: 














 06/05/18 07:21 





 06/05/18 07:21 





 Lab Results





06/05/18 07:21: Sodium 146, Potassium 3.6, Chloride 106, Carbon Dioxide 23, 

Anion Gap 21 H, BUN 10, Creatinine 0.6 L, Est GFR ( Amer) > 60, Est GFR (

Non-Af Amer) > 60, Random Glucose 115 H, Calcium 9.6, Magnesium 1.9, Total 

Bilirubin 0.9, AST 65 H D, ALT 91 H, Alkaline Phosphatase 104, Total Protein 

8.5 H, Albumin 4.7, Globulin 3.8, Albumin/Globulin Ratio 1.2


06/05/18 07:21: WBC 11.9 H D, RBC 4.76, Hgb 13.5, Hct 41.5, MCV 87.2, MCH 28.4, 

MCHC 32.5, RDW 13.5, Plt Count 302, MPV 9.8, Gran % 59.6, Lymph % (Auto) 32.1, 

Mono % (Auto) 6.6 H, Eos % (Auto) 1.4 L, Baso % (Auto) 0.3, Gran # 7.09 H, 

Lymph # (Auto) 3.8 H, Mono # (Auto) 0.8 H, Eos # (Auto) 0.2, Baso # (Auto) 0.03


06/04/18 07:00: RPR Nonreactive


06/04/18 07:00: TSH 3rd Generation 1.26


06/04/18 07:00: Triglycerides 59, Cholesterol 95 L, LDL Cholesterol Direct 42, 

HDL Cholesterol 36


06/03/18 20:21: Alcohol, Quantitative < 10


06/03/18 20:21: Salicylates < 1 L, Acetaminophen < 10.0 L


06/03/18 20:21: Sodium 142, Potassium 3.5 L, Chloride 103, Carbon Dioxide 23, 

Anion Gap 19, BUN 7, Creatinine 0.5 L, Est GFR ( Amer) > 60, Est GFR (Non

-Af Amer) > 60, Random Glucose 103, Calcium 9.4, Magnesium 1.8, Total Bilirubin 

1.6 H, AST 37 H, ALT 63 H, Alkaline Phosphatase 97, Total Protein 9.0 H, 

Albumin 4.9 H, Globulin 4.1, Albumin/Globulin Ratio 1.2


06/03/18 20:21: WBC 16.3 H, RBC 4.80, Hgb 13.8, Hct 41.1, MCV 85.6, MCH 28.8, 

MCHC 33.6, RDW 13.4, Plt Count 290, MPV 9.7, Gran % 75.5 H, Lymph % (Auto) 16.2 

L, Mono % (Auto) 8.0 H, Eos % (Auto) 0.1 L, Baso % (Auto) 0.2, Gran # 12.33 H, 

Lymph # (Auto) 2.7, Mono # (Auto) 1.3 H, Eos # (Auto) 0.0, Baso # (Auto) 0.03


06/03/18 20:02: Urine Opiates Screen Positive H, Urine Methadone Screen Negative

, Ur Barbiturates Screen Negative, Ur Phencyclidine Scrn Negative, Ur 

Amphetamines Screen Negative, U Benzodiazepines Scrn Positive H, U Oth Cocaine 

Metabols Positive H, U Cannabinoids Screen Negative


06/03/18 20:02: Urine Color Yellow, Urine Appearance Clear, Urine pH 5.5, Ur 

Specific Gravity >= 1.030, Urine Protein Negative, Urine Glucose (UA) Negative, 

Urine Ketones Trace H, Urine Blood Negative, Urine Nitrate Negative, Urine 

Bilirubin Negative, Urine Urobilinogen 0.2, Ur Leukocyte Esterase Negative











Vital Signs











  Temp Pulse Resp BP Pulse Ox


 


 06/05/18 07:00  98.5 F  125 H  18  147/97 H  100


 


 06/04/18 15:00   89   126/72 


 


 06/04/18 07:33  98.5 F  84  20  133/79 


 


 06/04/18 05:45    18  


 


 06/04/18 04:03  98.2 F  87  20  133/72  98


 


 06/04/18 03:24  98.2 F  87  20  133/72  98


 


 06/04/18 01:30  99.2 F  94 H  20  146/77  99


 


 06/03/18 19:44  98.4 F  80  18  107/92 H  98














 











Temp Pulse Resp BP Pulse Ox


 


 98.0 F   144 H  20   139/82   100 


 


 06/07/18 07:15  06/07/18 07:15  06/07/18 07:15  06/07/18 07:15  06/05/18 07:00











 











Temp Pulse Resp BP Pulse Ox


 


 97.8 F   96 H  20   128/77   100 


 


 06/11/18 06:35  06/11/18 16:16  06/11/18 06:35  06/11/18 16:16  06/05/18 07:00











 Laboratory Results - last 24 hr











  06/22/18





  07:30


 


WBC  11.7 H


 


RBC  4.61


 


Hgb  13.1


 


Hct  39.7


 


MCV  86.1


 


MCH  28.4


 


MCHC  33.0


 


RDW  13.0


 


Plt Count  326


 


MPV  9.2


 


Gran %  60.4


 


Lymph % (Auto)  30.1


 


Mono % (Auto)  7.2 H


 


Eos % (Auto)  2.0


 


Baso % (Auto)  0.3


 


Gran #  7.08 H


 


Lymph # (Auto)  3.5 H


 


Mono # (Auto)  0.8 H


 


Eos # (Auto)  0.2


 


Baso # (Auto)  0.03








no signs of agranulocytosis, Clozaril [facility] was contacted and Gran# submitted 6/ 22/18


DSM 5 Symptoms Update: 





Shortly pt is 18yo  Female with reported history of psychosis, most 

likely schizophrenia, pt has one psych admission to [facility] last 

year, pt was initially admitted to ICU, confused and agitated, pt was having 

command type hallucinations back then, [place], "doing 

exorcism", had impression that her father was not her biological father, pt 

staid here for almost a month, after the d/c  pt was noncompliant with 

medications and follow up appts, pt was using drugs, as a result pt was confused

, was wondering on streets, was psychotic, was not able to function. Pt 

required further evaluation and stabilization, meds resumption and titration. 





patient was started on Clozaril 6/15/18, patient tolerating that medication well

, pt is still psychotic, over the weekend pt had tendency to wonder, 

disorganized, catatonia improving, affect was more reactive, bu pt is weird, 

flat affect, does not make much sense. today patient reported "not hear any 

voices, I feel better, I am more calm".





so far patient tolerates medications well, no side effects observed or reported

, aims 0, no EPS.





Impression:


schizophrenia


r/o substance induced psychosis. 


Medication Change: Yes (Clozaril increased, Geodon decreased)


Medical Record Reviewed: Yes





Mental Status Examination





- Cognitive Function


Orientation: Person, Place


Memory: Impaired


Attention: Poor (some improvement)


Concentration: Poor (some improvement)


Association: Loose (some improvement)


Fund of Knowledge: Poor





- Mood


Mood: Neutral





- Affect


Affect: Constricted, Flat, Other (pt was giggling inappropriately, )





- Speech


Speech: Soft





- Formal Thought Process


Formal Thought Process: Hallucinations (denies today), Delusions, Paranoia (

endorses), Loosening of associations, Circumstantial





- Suicidal Ideation


Suicidal Ideation: No





- Homicidal Ideation


Homicidal Ideation: No





Goal/Treatment Plan





- Goal/Treatment Plan


Need for Continued Stay: Remain at risks for inpatient hospitalization, Severe 

depression anxiety, Discharge may exacerbated symptoms, Severe functional 

impairment


Progress Toward Problem(s) and Goals/Treatment Plan: 


Milieu/structure/supportive therapy 


Medical consult will be called for leukocytosis


seroquel was discontinued


geodon  d/c


clozaril 50mg am and 200mg hs for psychosis


ativan 2mg po bid for catatonia


collaterals from family appreciated, meeting appreciated with uncle 6/15/18


SW consultation for discharge plan and social issues 


Follow up on labs 


Will monitor closely 


Pt was educated about risk/benefits and alternatives of medications, coping 

strategies (safety plan, suicide prevention), relapse prevention, importance of 

follow up with psychiatrist and therapist, stay away from drugs/alcohol/smoking





Estimated Date of D/C: 06/29/18

## 2018-06-26 NOTE — PCM.PYCHPN
Psychiatric Progress Note





- Psychiatric Progress Note


Patient seen today, length of contact: 30 minutes


Patient Chief Complaint: 





"I do not hear any voices, I feel better, I am more calm".


Problems Identified/Issues Discussed: 





Suicide/ homicide prevention, past psychiatric h/o, current psychiatric symptoms

, medical problems, risk/benefits and alternatives of medications, medications 

compliance, coping strategies, substance abuse h/o, relapse prevention, 

importance of follow up with psychiatrist and therapist, discharge plan. 


Medical Problems: 





see HPI


Diagnostic Results: 














 06/05/18 07:21 





 06/05/18 07:21 





 Lab Results





06/05/18 07:21: Sodium 146, Potassium 3.6, Chloride 106, Carbon Dioxide 23, 

Anion Gap 21 H, BUN 10, Creatinine 0.6 L, Est GFR ( Amer) > 60, Est GFR (

Non-Af Amer) > 60, Random Glucose 115 H, Calcium 9.6, Magnesium 1.9, Total 

Bilirubin 0.9, AST 65 H D, ALT 91 H, Alkaline Phosphatase 104, Total Protein 

8.5 H, Albumin 4.7, Globulin 3.8, Albumin/Globulin Ratio 1.2


06/05/18 07:21: WBC 11.9 H D, RBC 4.76, Hgb 13.5, Hct 41.5, MCV 87.2, MCH 28.4, 

MCHC 32.5, RDW 13.5, Plt Count 302, MPV 9.8, Gran % 59.6, Lymph % (Auto) 32.1, 

Mono % (Auto) 6.6 H, Eos % (Auto) 1.4 L, Baso % (Auto) 0.3, Gran # 7.09 H, 

Lymph # (Auto) 3.8 H, Mono # (Auto) 0.8 H, Eos # (Auto) 0.2, Baso # (Auto) 0.03


06/04/18 07:00: RPR Nonreactive


06/04/18 07:00: TSH 3rd Generation 1.26


06/04/18 07:00: Triglycerides 59, Cholesterol 95 L, LDL Cholesterol Direct 42, 

HDL Cholesterol 36


06/03/18 20:21: Alcohol, Quantitative < 10


06/03/18 20:21: Salicylates < 1 L, Acetaminophen < 10.0 L


06/03/18 20:21: Sodium 142, Potassium 3.5 L, Chloride 103, Carbon Dioxide 23, 

Anion Gap 19, BUN 7, Creatinine 0.5 L, Est GFR ( Amer) > 60, Est GFR (Non

-Af Amer) > 60, Random Glucose 103, Calcium 9.4, Magnesium 1.8, Total Bilirubin 

1.6 H, AST 37 H, ALT 63 H, Alkaline Phosphatase 97, Total Protein 9.0 H, 

Albumin 4.9 H, Globulin 4.1, Albumin/Globulin Ratio 1.2


06/03/18 20:21: WBC 16.3 H, RBC 4.80, Hgb 13.8, Hct 41.1, MCV 85.6, MCH 28.8, 

MCHC 33.6, RDW 13.4, Plt Count 290, MPV 9.7, Gran % 75.5 H, Lymph % (Auto) 16.2 

L, Mono % (Auto) 8.0 H, Eos % (Auto) 0.1 L, Baso % (Auto) 0.2, Gran # 12.33 H, 

Lymph # (Auto) 2.7, Mono # (Auto) 1.3 H, Eos # (Auto) 0.0, Baso # (Auto) 0.03


06/03/18 20:02: Urine Opiates Screen Positive H, Urine Methadone Screen Negative

, Ur Barbiturates Screen Negative, Ur Phencyclidine Scrn Negative, Ur 

Amphetamines Screen Negative, U Benzodiazepines Scrn Positive H, U Oth Cocaine 

Metabols Positive H, U Cannabinoids Screen Negative


06/03/18 20:02: Urine Color Yellow, Urine Appearance Clear, Urine pH 5.5, Ur 

Specific Gravity >= 1.030, Urine Protein Negative, Urine Glucose (UA) Negative, 

Urine Ketones Trace H, Urine Blood Negative, Urine Nitrate Negative, Urine 

Bilirubin Negative, Urine Urobilinogen 0.2, Ur Leukocyte Esterase Negative











Vital Signs











  Temp Pulse Resp BP Pulse Ox


 


 06/05/18 07:00  98.5 F  125 H  18  147/97 H  100


 


 06/04/18 15:00   89   126/72 


 


 06/04/18 07:33  98.5 F  84  20  133/79 


 


 06/04/18 05:45    18  


 


 06/04/18 04:03  98.2 F  87  20  133/72  98


 


 06/04/18 03:24  98.2 F  87  20  133/72  98


 


 06/04/18 01:30  99.2 F  94 H  20  146/77  99


 


 06/03/18 19:44  98.4 F  80  18  107/92 H  98














 











Temp Pulse Resp BP Pulse Ox


 


 98.0 F   144 H  20   139/82   100 


 


 06/07/18 07:15  06/07/18 07:15  06/07/18 07:15  06/07/18 07:15  06/05/18 07:00











 











Temp Pulse Resp BP Pulse Ox


 


 97.8 F   96 H  20   128/77   100 


 


 06/11/18 06:35  06/11/18 16:16  06/11/18 06:35  06/11/18 16:16  06/05/18 07:00











 Laboratory Results - last 24 hr











  06/22/18





  07:30


 


WBC  11.7 H


 


RBC  4.61


 


Hgb  13.1


 


Hct  39.7


 


MCV  86.1


 


MCH  28.4


 


MCHC  33.0


 


RDW  13.0


 


Plt Count  326


 


MPV  9.2


 


Gran %  60.4


 


Lymph % (Auto)  30.1


 


Mono % (Auto)  7.2 H


 


Eos % (Auto)  2.0


 


Baso % (Auto)  0.3


 


Gran #  7.08 H


 


Lymph # (Auto)  3.5 H


 


Mono # (Auto)  0.8 H


 


Eos # (Auto)  0.2


 


Baso # (Auto)  0.03








no signs of agranulocytosis, Cloz[facility]il [facility] was contacted and Gran# submitted 6/ 22/18


DSM 5 Symptoms Update: 





Shortly pt is 16yo  Female with reported history of psychosis, most 

likely schizophrenia, pt has one psych admission to [facility] last 

year, pt was initially admitted to ICU, confused and agitated, pt was having 

command type hallucinations back then, was found [place] the train station, "doing 

exorcism", had impression that her father was not her biological father, pt 

staid here for almost a month, after the d/c  pt was noncompliant with 

medications and follow up appts, pt was using drugs, as a result pt was confused

, was wondering on streets, was psychotic, was not able to function. Pt 

required further evaluation and stabilization, meds resumption and titration. 





patient was started on Clozaril 6/15/18, patient tolerating that medication well

, pt is still psychotic, affect was more bright today, pt talked faster, but 

nonsense, disorganized, catatonia improving, pt is weird, oddly related, pt 

said that she "not hear any voices, I feel better, I am more calm".





so far patient tolerates medications well, no side effects observed or reported

, aims 0, no EPS.





Impression:


schizophrenia


r/o substance induced psychosis. 


Medication Change: Yes (Clozaril increased, Geodon decreased)


Medical Record Reviewed: Yes





Mental Status Examination





- Cognitive Function


Orientation: Person, Place


Memory: Impaired


Attention: Poor (some improvement)


Concentration: Poor (some improvement)


Association: Loose (some improvement)


Fund of Knowledge: Poor





- Mood


Mood: Neutral





- Affect


Affect: Constricted, Flat, Other (pt was giggling inappropriately, )





- Speech


Speech: Soft





- Formal Thought Process


Formal Thought Process: Hallucinations (denies today), Delusions, Paranoia (

endorses), Loosening of associations, Circumstantial





- Suicidal Ideation


Suicidal Ideation: No





- Homicidal Ideation


Homicidal Ideation: No





Goal/Treatment Plan





- Goal/Treatment Plan


Need for Continued Stay: Remain at risks for inpatient hospitalization, Severe 

depression anxiety, Discharge may exacerbated symptoms, Severe functional 

impairment


Progress Toward Problem(s) and Goals/Treatment Plan: 


Milieu/structure/supportive therapy 


Medical consult will be called for leukocytosis


seroquel was discontinued


geodon  d/c


clozaril 50mg am and 200mg hs for psychosis


ativan 2mg po bid for catatonia


collaterals from family appreciated, meeting appreciated with uncle 6/15/18


SW consultation for discharge plan and social issues 


Follow up on labs 


Will monitor closely 


Pt was educated about risk/benefits and alternatives of medications, coping 

strategies (safety plan, suicide prevention), relapse prevention, importance of 

follow up with psychiatrist and therapist, stay away from drugs/alcohol/smoking





Estimated Date of D/C: 06/29/18

## 2018-06-27 NOTE — PCM.[email or web address]
Psychiatric Progress Note





- Psychiatric Progress Note


Patient seen today, length of contact: 30 minutes


Patient Chief Complaint: 





"I am kind of alright"


Problems Identified/Issues Discussed: 





Suicide/ homicide prevention, past psychiatric h/o, current psychiatric symptoms

, medical problems, risk/benefits and alternatives of medications, medications 

compliance, coping strategies, substance abuse h/o, relapse prevention, 

importance of follow up with psychiatrist and therapist, discharge plan. 


Medical Problems: 





see HPI


Diagnostic Results: 














 06/05/18 07:21 





 06/05/18 07:21 





 Lab Results





06/05/18 07:21: Sodium 146, Potassium 3.6, Chloride 106, Carbon Dioxide 23, 

Anion Gap 21 H, BUN 10, Creatinine 0.6 L, Est GFR ( Amer) > 60, Est GFR (

Non-Af Amer) > 60, Random Glucose 115 H, Calcium 9.6, Magnesium 1.9, Total 

Bilirubin 0.9, AST 65 H D, ALT 91 H, Alkaline Phosphatase 104, Total Protein 

8.5 H, Albumin 4.7, Globulin 3.8, Albumin/Globulin Ratio 1.2


06/05/18 07:21: WBC 11.9 H D, RBC 4.76, Hgb 13.5, Hct 41.5, MCV 87.2, MCH 28.4, 

MCHC 32.5, RDW 13.5, Plt Count 302, MPV 9.8, Gran % 59.6, Lymph % (Auto) 32.1, 

Mono % (Auto) 6.6 H, Eos % (Auto) 1.4 L, Baso % (Auto) 0.3, Gran # 7.09 H, 

Lymph # (Auto) 3.8 H, Mono # (Auto) 0.8 H, Eos # (Auto) 0.2, Baso # (Auto) 0.03


06/04/18 07:00: RPR Nonreactive


06/04/18 07:00: TSH 3rd Generation 1.26


06/04/18 07:00: Triglycerides 59, Cholesterol 95 L, LDL Cholesterol Direct 42, 

HDL Cholesterol 36


06/03/18 20:21: Alcohol, Quantitative < 10


06/03/18 20:21: Salicylates < 1 L, Acetaminophen < 10.0 L


06/03/18 20:21: Sodium 142, Potassium 3.5 L, Chloride 103, Carbon Dioxide 23, 

Anion Gap 19, BUN 7, Creatinine 0.5 L, Est GFR ( Amer) > 60, Est GFR (Non

-Af Amer) > 60, Random Glucose 103, Calcium 9.4, Magnesium 1.8, Total Bilirubin 

1.6 H, AST 37 H, ALT 63 H, Alkaline Phosphatase 97, Total Protein 9.0 H, 

Albumin 4.9 H, Globulin 4.1, Albumin/Globulin Ratio 1.2


06/03/18 20:21: WBC 16.3 H, RBC 4.80, Hgb 13.8, Hct 41.1, MCV 85.6, MCH 28.8, 

MCHC 33.6, RDW 13.4, Plt Count 290, MPV 9.7, Gran % 75.5 H, Lymph % (Auto) 16.2 

L, Mono % (Auto) 8.0 H, Eos % (Auto) 0.1 L, Baso % (Auto) 0.2, Gran # 12.33 H, 

Lymph # (Auto) 2.7, Mono # (Auto) 1.3 H, Eos # (Auto) 0.0, Baso # (Auto) 0.03


06/03/18 20:02: Urine Opiates Screen Positive H, Urine Methadone Screen Negative

, Ur Barbiturates Screen Negative, Ur Phencyclidine Scrn Negative, Ur 

Amphetamines Screen Negative, U Benzodiazepines Scrn Positive H, U Oth Cocaine 

Metabols Positive H, U Cannabinoids Screen Negative


06/03/18 20:02: Urine Color Yellow, Urine Appearance Clear, Urine pH 5.5, Ur 

Specific Gravity >= 1.030, Urine Protein Negative, Urine Glucose (UA) Negative, 

Urine Ketones Trace H, Urine Blood Negative, Urine Nitrate Negative, Urine 

Bilirubin Negative, Urine Urobilinogen 0.2, Ur Leukocyte Esterase Negative











Vital Signs











  Temp Pulse Resp BP Pulse Ox


 


 06/05/18 07:00  98.5 F  125 H  18  147/97 H  100


 


 06/04/18 15:00   89   126/72 


 


 06/04/18 07:33  98.5 F  84  20  133/79 


 


 06/04/18 05:45    18  


 


 06/04/18 04:03  98.2 F  87  20  133/72  98


 


 06/04/18 03:24  98.2 F  87  20  133/72  98


 


 06/04/18 01:30  99.2 F  94 H  20  146/77  99


 


 06/03/18 19:44  98.4 F  80  18  107/92 H  98














 











Temp Pulse Resp BP Pulse Ox


 


 98.0 F   144 H  20   139/82   100 


 


 06/07/18 07:15  06/07/18 07:15  06/07/18 07:15  06/07/18 07:15  06/05/18 07:00











 











Temp Pulse Resp BP Pulse Ox


 


 97.8 F   96 H  20   128/77   100 


 


 06/11/18 06:35  06/11/18 16:16  06/11/18 06:35  06/11/18 16:16  06/05/18 07:00











 Laboratory Results - last 24 hr











  06/22/18





  07:30


 


WBC  11.7 H


 


RBC  4.61


 


Hgb  13.1


 


Hct  39.7


 


MCV  86.1


 


MCH  28.4


 


MCHC  33.0


 


RDW  13.0


 


Plt Count  326


 


MPV  9.2


 


Gran %  60.4


 


Lymph % (Auto)  30.1


 


Mono % (Auto)  7.2 H


 


Eos % (Auto)  2.0


 


Baso % (Auto)  0.3


 


Gran #  7.08 H


 


Lymph # (Auto)  3.5 H


 


Mono # (Auto)  0.8 H


 


Eos # (Auto)  0.2


 


Baso # (Auto)  0.03








no signs of agranulocytosis, Cl[facility]il [facility] was contacted and Gran# submitted 6/ 22/18


DSM 5 Symptoms Update: 





Shortly pt is 16yo  Female with reported history of psychosis, most 

likely schizophrenia, pt has one psych admission to [facility] last 

year, pt was initially admitted to ICU, confused and agitated, pt was having 

command type hallucinations back then, [place], "doing 

exorcism", had impression that her father was not her biological father, pt 

staid here for almost a month, after the d/c  pt was noncompliant with 

medications and follow up appts, pt was using drugs, as a result pt was confused

, was wondering on streets, was psychotic, was not able to function. Pt 

required further evaluation and stabilization, meds resumption and titration. 





patient was started on Clozaril 6/15/18, patient tolerating that medication well

, pt is still psychotic, affect was more bright today, pt talked faster, but 

nonsense, disorganized, catatonia improving, pt is weird, oddly related, pt 

said that she "not hear any voices, I feel better, I am more calm". as per 

staff pt is not aggressive, not catatonic, but thought process is disorganized 

as well as her behavior, pt needs redirection. 





so far patient tolerates medications well, no side effects observed or reported

, aims 0, no EPS.





Impression:


schizophrenia


r/o substance induced psychosis. 


Medication Change: Yes (Clozaril increased, Geodon decreased)


Medical Record Reviewed: Yes





Mental Status Examination





- Cognitive Function


Orientation: Person, Place


Memory: Impaired


Attention: Poor (some improvement)


Concentration: Poor (some improvement)


Association: Loose (some improvement)


Fund of Knowledge: Poor





- Mood


Mood: Neutral





- Affect


Affect: Constricted, Flat, Other (pt was giggling inappropriately, )





- Speech


Speech: Soft





- Formal Thought Process


Formal Thought Process: Hallucinations (denies today), Delusions, Paranoia (

endorses), Loosening of associations, Circumstantial





- Suicidal Ideation


Suicidal Ideation: No





- Homicidal Ideation


Homicidal Ideation: No





Goal/Treatment Plan





- Goal/Treatment Plan


Need for Continued Stay: Remain at risks for inpatient hospitalization, Severe 

depression anxiety, Discharge may exacerbated symptoms, Severe functional 

impairment


Progress Toward Problem(s) and Goals/Treatment Plan: 


Milieu/structure/supportive therapy 


Medical consult will be called for leukocytosis


seroquel was discontinued


geodon  d/c


clozaril 100mg am and 200mg hs for psychosis


ativan 2mg po bid for catatonia


collaterals from family appreciated, meeting appreciated with uncle 6/15/18


 consultation for discharge plan and social issues 


Follow up on labs 


Will monitor closely 


Pt was educated about risk/benefits and alternatives of medications, coping 

strategies (safety plan, suicide prevention), relapse prevention, importance of 

follow up with psychiatrist and therapist, stay away from drugs/alcohol/smoking





Estimated Date of D/C: 07/02/18

## 2018-06-28 NOTE — PCM.[email or web address]
Psychiatric Progress Note





- Psychiatric Progress Note


Patient seen today, length of contact: 30 minutes


Patient Chief Complaint: 





"I am kind of alright, I feel okay"


Problems Identified/Issues Discussed: 





Suicide/ homicide prevention, past psychiatric h/o, current psychiatric symptoms

, medical problems, risk/benefits and alternatives of medications, medications 

compliance, coping strategies, substance abuse h/o, relapse prevention, 

importance of follow up with psychiatrist and therapist, discharge plan. 


Medical Problems: 





see HPI


Diagnostic Results: 














 06/05/18 07:21 





 06/05/18 07:21 





 Lab Results





06/05/18 07:21: Sodium 146, Potassium 3.6, Chloride 106, Carbon Dioxide 23, 

Anion Gap 21 H, BUN 10, Creatinine 0.6 L, Est GFR ( Amer) > 60, Est GFR (

Non-Af Amer) > 60, Random Glucose 115 H, Calcium 9.6, Magnesium 1.9, Total 

Bilirubin 0.9, AST 65 H D, ALT 91 H, Alkaline Phosphatase 104, Total Protein 

8.5 H, Albumin 4.7, Globulin 3.8, Albumin/Globulin Ratio 1.2


06/05/18 07:21: WBC 11.9 H D, RBC 4.76, Hgb 13.5, Hct 41.5, MCV 87.2, MCH 28.4, 

MCHC 32.5, RDW 13.5, Plt Count 302, MPV 9.8, Gran % 59.6, Lymph % (Auto) 32.1, 

Mono % (Auto) 6.6 H, Eos % (Auto) 1.4 L, Baso % (Auto) 0.3, Gran # 7.09 H, 

Lymph # (Auto) 3.8 H, Mono # (Auto) 0.8 H, Eos # (Auto) 0.2, Baso # (Auto) 0.03


06/04/18 07:00: RPR Nonreactive


06/04/18 07:00: TSH 3rd Generation 1.26


06/04/18 07:00: Triglycerides 59, Cholesterol 95 L, LDL Cholesterol Direct 42, 

HDL Cholesterol 36


06/03/18 20:21: Alcohol, Quantitative < 10


06/03/18 20:21: Salicylates < 1 L, Acetaminophen < 10.0 L


06/03/18 20:21: Sodium 142, Potassium 3.5 L, Chloride 103, Carbon Dioxide 23, 

Anion Gap 19, BUN 7, Creatinine 0.5 L, Est GFR ( Amer) > 60, Est GFR (Non

-Af Amer) > 60, Random Glucose 103, Calcium 9.4, Magnesium 1.8, Total Bilirubin 

1.6 H, AST 37 H, ALT 63 H, Alkaline Phosphatase 97, Total Protein 9.0 H, 

Albumin 4.9 H, Globulin 4.1, Albumin/Globulin Ratio 1.2


06/03/18 20:21: WBC 16.3 H, RBC 4.80, Hgb 13.8, Hct 41.1, MCV 85.6, MCH 28.8, 

MCHC 33.6, RDW 13.4, Plt Count 290, MPV 9.7, Gran % 75.5 H, Lymph % (Auto) 16.2 

L, Mono % (Auto) 8.0 H, Eos % (Auto) 0.1 L, Baso % (Auto) 0.2, Gran # 12.33 H, 

Lymph # (Auto) 2.7, Mono # (Auto) 1.3 H, Eos # (Auto) 0.0, Baso # (Auto) 0.03


06/03/18 20:02: Urine Opiates Screen Positive H, Urine Methadone Screen Negative

, Ur Barbiturates Screen Negative, Ur Phencyclidine Scrn Negative, Ur 

Amphetamines Screen Negative, U Benzodiazepines Scrn Positive H, U Oth Cocaine 

Metabols Positive H, U Cannabinoids Screen Negative


06/03/18 20:02: Urine Color Yellow, Urine Appearance Clear, Urine pH 5.5, Ur 

Specific Gravity >= 1.030, Urine Protein Negative, Urine Glucose (UA) Negative, 

Urine Ketones Trace H, Urine Blood Negative, Urine Nitrate Negative, Urine 

Bilirubin Negative, Urine Urobilinogen 0.2, Ur Leukocyte Esterase Negative











Vital Signs











  Temp Pulse Resp BP Pulse Ox


 


 06/05/18 07:00  98.5 F  125 H  18  147/97 H  100


 


 06/04/18 15:00   89   126/72 


 


 06/04/18 07:33  98.5 F  84  20  133/79 


 


 06/04/18 05:45    18  


 


 06/04/18 04:03  98.2 F  87  20  133/72  98


 


 06/04/18 03:24  98.2 F  87  20  133/72  98


 


 06/04/18 01:30  99.2 F  94 H  20  146/77  99


 


 06/03/18 19:44  98.4 F  80  18  107/92 H  98














 











Temp Pulse Resp BP Pulse Ox


 


 98.0 F   144 H  20   139/82   100 


 


 06/07/18 07:15  06/07/18 07:15  06/07/18 07:15  06/07/18 07:15  06/05/18 07:00











 











Temp Pulse Resp BP Pulse Ox


 


 97.8 F   96 H  20   128/77   100 


 


 06/11/18 06:35  06/11/18 16:16  06/11/18 06:35  06/11/18 16:16  06/05/18 07:00











 Laboratory Results - last 24 hr











  06/22/18





  07:30


 


WBC  11.7 H


 


RBC  4.61


 


Hgb  13.1


 


Hct  39.7


 


MCV  86.1


 


MCH  28.4


 


MCHC  33.0


 


RDW  13.0


 


Plt Count  326


 


MPV  9.2


 


Gran %  60.4


 


Lymph % (Auto)  30.1


 


Mono % (Auto)  7.2 H


 


Eos % (Auto)  2.0


 


Baso % (Auto)  0.3


 


Gran #  7.08 H


 


Lymph # (Auto)  3.5 H


 


Mono # (Auto)  0.8 H


 


Eos # (Auto)  0.2


 


Baso # (Auto)  0.03








no signs of agranulocytosis, Cl[facility] was contacted and Gran# submitted 6/ 22/18


DSM 5 Symptoms Update: 





Shortly pt is 16yo  Female with reported history of psychosis, most 

likely schizophrenia, pt has one psych admission to [facility] last 

year, pt was initially admitted to ICU, confused and agitated, pt was having 

command type hallucinations back then, was found [place] the [place] station, "doing 

exorcism", had impression that her father was not her biological father, pt 

staid here for almost a month, after the d/c  pt was noncompliant with 

medications and follow up appts, pt was using drugs, as a result pt was confused

, was wondering on streets, was psychotic, was not able to function. Pt 

required further evaluation and stabilization, meds resumption and titration. 





patient was started on Clozaril 6/15/18, patient tolerating that medication well

, pt is less psychotic, affect was more bright today, pt talked faster, makes 

some sense, at times circumstantial thought process, pt is weird, oddly related

, catatonia is improving.





so far patient tolerates medications well, no side effects observed or reported

, aims 0, no EPS.





Impression:


schizophrenia


r/o substance induced psychosis. 


Medication Change: Yes (Clozaril increased, Geodon decreased)


Medical Record Reviewed: Yes





Mental Status Examination





- Cognitive Function


Orientation: Person, Place


Memory: Impaired


Attention: Poor (some improvement)


Concentration: Poor (some improvement)


Association: Loose (some improvement)


Fund of Knowledge: Poor





- Mood


Mood: Neutral





- Affect


Affect: Constricted, Flat, Other (pt was giggling inappropriately, )





- Speech


Speech: Soft





- Formal Thought Process


Formal Thought Process: Hallucinations (denies today), Delusions, Paranoia (

endorses), Loosening of associations, Circumstantial





- Suicidal Ideation


Suicidal Ideation: No





- Homicidal Ideation


Homicidal Ideation: No





Goal/Treatment Plan





- Goal/Treatment Plan


Need for Continued Stay: Remain at risks for inpatient hospitalization, Severe 

depression anxiety, Discharge may exacerbated symptoms, Severe functional 

impairment


Progress Toward Problem(s) and Goals/Treatment Plan: 


Milieu/structure/supportive therapy 


Medical consult will be called for leukocytosis


seroquel was discontinued


geodon  d/c


clozaril 100mg am and 200mg hs for psychosis


ativan 0.5mg po tid for catatonia


collaterals from family appreciated, meeting appreciated with uncle 6/15/18


 consultation for discharge plan and social issues 


Follow up on labs 


Will monitor closely 


Pt was educated about risk/benefits and alternatives of medications, coping 

strategies (safety plan, suicide prevention), relapse prevention, importance of 

follow up with psychiatrist and therapist, stay away from drugs/alcohol/smoking





Estimated Date of D/C: 07/02/18

## 2018-06-29 LAB
BASOPHILS # BLD AUTO: 0.01 K/MM3 (ref 0–2)
BASOPHILS NFR BLD: 0.1 % (ref 0–3)
EOSINOPHIL # BLD: 0.4 10*3/UL (ref 0–0.7)
EOSINOPHIL NFR BLD: 3.6 % (ref 1.5–5)
ERYTHROCYTE [DISTWIDTH] IN BLOOD BY AUTOMATED COUNT: 13.3 % (ref 11.5–14.5)
GRANULOCYTES # BLD: 6.92 10*3/UL (ref 1.4–6.5)
GRANULOCYTES NFR BLD: 67.1 % (ref 50–68)
HGB BLD-MCNC: 13.1 G/DL (ref 12–16)
LYMPHOCYTES # BLD: 2 10*3/UL (ref 1.2–3.4)
LYMPHOCYTES NFR BLD AUTO: 19.4 % (ref 22–35)
MCH RBC QN AUTO: 28.4 PG (ref 25–35)
MCHC RBC AUTO-ENTMCNC: 32.1 G/DL (ref 31–37)
MCV RBC AUTO: 88.3 FL (ref 80–105)
MONOCYTES # BLD AUTO: 1 10*3/UL (ref 0.1–0.6)
MONOCYTES NFR BLD: 9.8 % (ref 1–6)
PLATELET # BLD: 289 10^3/UL (ref 120–450)
PMV BLD AUTO: 9.6 FL (ref 7–11)
RBC # BLD AUTO: 4.62 10^6/UL (ref 3.5–6.1)
WBC # BLD AUTO: 10.3 10^3/UL (ref 4.5–11)

## 2018-06-29 NOTE — PCM.[email or web address]
Psychiatric Progress Note





- Psychiatric Progress Note


Patient seen today, length of contact: 30 minutes


Patient Chief Complaint: 





"I am kind of alright, I feel okay"


Problems Identified/Issues Discussed: 





Suicide/ homicide prevention, past psychiatric h/o, current psychiatric symptoms

, medical problems, risk/benefits and alternatives of medications, medications 

compliance, coping strategies, substance abuse h/o, relapse prevention, 

importance of follow up with psychiatrist and therapist, discharge plan. 


Medical Problems: 





see HPI


Diagnostic Results: 














 06/05/18 07:21 





 06/05/18 07:21 





 Lab Results





06/05/18 07:21: Sodium 146, Potassium 3.6, Chloride 106, Carbon Dioxide 23, 

Anion Gap 21 H, BUN 10, Creatinine 0.6 L, Est GFR ( Amer) > 60, Est GFR (

Non-Af Amer) > 60, Random Glucose 115 H, Calcium 9.6, Magnesium 1.9, Total 

Bilirubin 0.9, AST 65 H D, ALT 91 H, Alkaline Phosphatase 104, Total Protein 

8.5 H, Albumin 4.7, Globulin 3.8, Albumin/Globulin Ratio 1.2


06/05/18 07:21: WBC 11.9 H D, RBC 4.76, Hgb 13.5, Hct 41.5, MCV 87.2, MCH 28.4, 

MCHC 32.5, RDW 13.5, Plt Count 302, MPV 9.8, Gran % 59.6, Lymph % (Auto) 32.1, 

Mono % (Auto) 6.6 H, Eos % (Auto) 1.4 L, Baso % (Auto) 0.3, Gran # 7.09 H, 

Lymph # (Auto) 3.8 H, Mono # (Auto) 0.8 H, Eos # (Auto) 0.2, Baso # (Auto) 0.03


06/04/18 07:00: RPR Nonreactive


06/04/18 07:00: TSH 3rd Generation 1.26


06/04/18 07:00: Triglycerides 59, Cholesterol 95 L, LDL Cholesterol Direct 42, 

HDL Cholesterol 36


06/03/18 20:21: Alcohol, Quantitative < 10


06/03/18 20:21: Salicylates < 1 L, Acetaminophen < 10.0 L


06/03/18 20:21: Sodium 142, Potassium 3.5 L, Chloride 103, Carbon Dioxide 23, 

Anion Gap 19, BUN 7, Creatinine 0.5 L, Est GFR ( Amer) > 60, Est GFR (Non

-Af Amer) > 60, Random Glucose 103, Calcium 9.4, Magnesium 1.8, Total Bilirubin 

1.6 H, AST 37 H, ALT 63 H, Alkaline Phosphatase 97, Total Protein 9.0 H, 

Albumin 4.9 H, Globulin 4.1, Albumin/Globulin Ratio 1.2


06/03/18 20:21: WBC 16.3 H, RBC 4.80, Hgb 13.8, Hct 41.1, MCV 85.6, MCH 28.8, 

MCHC 33.6, RDW 13.4, Plt Count 290, MPV 9.7, Gran % 75.5 H, Lymph % (Auto) 16.2 

L, Mono % (Auto) 8.0 H, Eos % (Auto) 0.1 L, Baso % (Auto) 0.2, Gran # 12.33 H, 

Lymph # (Auto) 2.7, Mono # (Auto) 1.3 H, Eos # (Auto) 0.0, Baso # (Auto) 0.03


06/03/18 20:02: Urine Opiates Screen Positive H, Urine Methadone Screen Negative

, Ur Barbiturates Screen Negative, Ur Phencyclidine Scrn Negative, Ur 

Amphetamines Screen Negative, U Benzodiazepines Scrn Positive H, U Oth Cocaine 

Metabols Positive H, U Cannabinoids Screen Negative


06/03/18 20:02: Urine Color Yellow, Urine Appearance Clear, Urine pH 5.5, Ur 

Specific Gravity >= 1.030, Urine Protein Negative, Urine Glucose (UA) Negative, 

Urine Ketones Trace H, Urine Blood Negative, Urine Nitrate Negative, Urine 

Bilirubin Negative, Urine Urobilinogen 0.2, Ur Leukocyte Esterase Negative











Vital Signs











  Temp Pulse Resp BP Pulse Ox


 


 06/05/18 07:00  98.5 F  125 H  18  147/97 H  100


 


 06/04/18 15:00   89   126/72 


 


 06/04/18 07:33  98.5 F  84  20  133/79 


 


 06/04/18 05:45    18  


 


 06/04/18 04:03  98.2 F  87  20  133/72  98


 


 06/04/18 03:24  98.2 F  87  20  133/72  98


 


 06/04/18 01:30  99.2 F  94 H  20  146/77  99


 


 06/03/18 19:44  98.4 F  80  18  107/92 H  98














 











Temp Pulse Resp BP Pulse Ox


 


 98.0 F   144 H  20   139/82   100 


 


 06/07/18 07:15  06/07/18 07:15  06/07/18 07:15  06/07/18 07:15  06/05/18 07:00











 











Temp Pulse Resp BP Pulse Ox


 


 97.8 F   96 H  20   128/77   100 


 


 06/11/18 06:35  06/11/18 16:16  06/11/18 06:35  06/11/18 16:16  06/05/18 07:00











 Laboratory Results - last 24 hr











  06/22/18





  07:30


 


WBC  11.7 H


 


RBC  4.61


 


Hgb  13.1


 


Hct  39.7


 


MCV  86.1


 


MCH  28.4


 


MCHC  33.0


 


RDW  13.0


 


Plt Count  326


 


MPV  9.2


 


Gran %  60.4


 


Lymph % (Auto)  30.1


 


Mono % (Auto)  7.2 H


 


Eos % (Auto)  2.0


 


Baso % (Auto)  0.3


 


Gran #  7.08 H


 


Lymph # (Auto)  3.5 H


 


Mono # (Auto)  0.8 H


 


Eos # (Auto)  0.2


 


Baso # (Auto)  0.03








no signs of agranulocytosis, Clozaril [facility] was contacted and Gran# submitted 6/ 22/18





 Laboratory Results - last 24 hr











  06/29/18





  06:00


 


WBC  10.3


 


RBC  4.62


 


Hgb  13.1


 


Hct  40.8


 


MCV  88.3


 


MCH  28.4


 


MCHC  32.1


 


RDW  13.3


 


Plt Count  289


 


MPV  9.6


 


Gran %  67.1


 


Lymph % (Auto)  19.4 L


 


Mono % (Auto)  9.8 H


 


Eos % (Auto)  3.6


 


Baso % (Auto)  0.1


 


Gran #  6.92 H


 


Lymph # (Auto)  2.0


 


Mono # (Auto)  1.0 H


 


Eos # (Auto)  0.4


 


Baso # (Auto)  0.01








no signs of agranulocytosis


submitted to [facility] 6/29/18


DSM 5 Symptoms Update: 





Shortly pt is 18yo  Female with reported history of psychosis, most 

likely schizophrenia, pt has one psych admission to [facility] last 

year, pt was initially admitted to ICU, confused and agitated, pt was having 

command type hallucinations back then, [place], "doing 

exorcism", had impression that her father was not her biological father, pt 

staid here for almost a month, after the d/c  pt was noncompliant with 

medications and follow up appts, pt was using drugs, as a result pt was confused

, was wondering on streets, was psychotic, was not able to function. Pt 

required further evaluation and stabilization, meds resumption and titration. 





patient was started on Clozaril 6/15/18, patient tolerating that medication well

, pt is less psychotic, affect was more bright today, pt talked faster, makes 

some sense, at times circumstantial thought process, pt showing some 

improvements, thought process is better organized. 





so far patient tolerates medications well, no side effects observed or reported

, aims 0, no EPS.





Impression:


schizophrenia


r/o substance induced psychosis. 


Medication Change: Yes (Clozaril increased, Geodon decreased)


Medical Record Reviewed: Yes





Mental Status Examination





- Cognitive Function


Orientation: Person, Place


Memory: Impaired


Attention: Poor (some improvement)


Concentration: Poor (some improvement)


Association: Loose (some improvement)


Fund of Knowledge: Poor





- Mood


Mood: Neutral





- Affect


Affect: Constricted, Flat, Other (pt was giggling inappropriately, )





- Speech


Speech: Soft





- Formal Thought Process


Formal Thought Process: Hallucinations (denies today), Delusions, Paranoia (

endorses), Loosening of associations, Circumstantial





- Suicidal Ideation


Suicidal Ideation: No





- Homicidal Ideation


Homicidal Ideation: No





Goal/Treatment Plan





- Goal/Treatment Plan


Need for Continued Stay: Remain at risks for inpatient hospitalization, Severe 

depression anxiety, Discharge may exacerbated symptoms, Severe functional 

impairment


Progress Toward Problem(s) and Goals/Treatment Plan: 


Milieu/structure/supportive therapy 


Medical consult will be called for leukocytosis


seroquel was discontinued


geodon  d/c


clozaril 100mg am and 200mg hs for psychosis


ativan 0.5mg po tid for catatonia


collaterals from family appreciated, meeting appreciated with uncle 6/15/18


SW consultation for discharge plan and social issues 


Follow up on labs 


Will monitor closely 


Pt was educated about risk/benefits and alternatives of medications, coping 

strategies (safety plan, suicide prevention), relapse prevention, importance of 

follow up with psychiatrist and therapist, stay away from drugs/alcohol/smoking





Estimated Date of D/C: 07/02/18

## 2018-06-30 NOTE — PCM.[email or web address]
Psychiatric Progress Note





- Psychiatric Progress Note


Patient seen today, length of contact: 30 minutes


Patient Chief Complaint: 





"I am kind of alright, I feel okay"


Problems Identified/Issues Discussed: 





Suicide/ homicide prevention, past psychiatric h/o, current psychiatric symptoms

, medical problems, risk/benefits and alternatives of medications, medications 

compliance, coping strategies, substance abuse h/o, relapse prevention, 

importance of follow up with psychiatrist and therapist, discharge plan. 


Medical Problems: 





see HPI


Diagnostic Results: 














 06/05/18 07:21 





 06/05/18 07:21 





 Lab Results





06/05/18 07:21: Sodium 146, Potassium 3.6, Chloride 106, Carbon Dioxide 23, 

Anion Gap 21 H, BUN 10, Creatinine 0.6 L, Est GFR ( Amer) > 60, Est GFR (

Non-Af Amer) > 60, Random Glucose 115 H, Calcium 9.6, Magnesium 1.9, Total 

Bilirubin 0.9, AST 65 H D, ALT 91 H, Alkaline Phosphatase 104, Total Protein 

8.5 H, Albumin 4.7, Globulin 3.8, Albumin/Globulin Ratio 1.2


06/05/18 07:21: WBC 11.9 H D, RBC 4.76, Hgb 13.5, Hct 41.5, MCV 87.2, MCH 28.4, 

MCHC 32.5, RDW 13.5, Plt Count 302, MPV 9.8, Gran % 59.6, Lymph % (Auto) 32.1, 

Mono % (Auto) 6.6 H, Eos % (Auto) 1.4 L, Baso % (Auto) 0.3, Gran # 7.09 H, 

Lymph # (Auto) 3.8 H, Mono # (Auto) 0.8 H, Eos # (Auto) 0.2, Baso # (Auto) 0.03


06/04/18 07:00: RPR Nonreactive


06/04/18 07:00: TSH 3rd Generation 1.26


06/04/18 07:00: Triglycerides 59, Cholesterol 95 L, LDL Cholesterol Direct 42, 

HDL Cholesterol 36


06/03/18 20:21: Alcohol, Quantitative < 10


06/03/18 20:21: Salicylates < 1 L, Acetaminophen < 10.0 L


06/03/18 20:21: Sodium 142, Potassium 3.5 L, Chloride 103, Carbon Dioxide 23, 

Anion Gap 19, BUN 7, Creatinine 0.5 L, Est GFR ( Amer) > 60, Est GFR (Non

-Af Amer) > 60, Random Glucose 103, Calcium 9.4, Magnesium 1.8, Total Bilirubin 

1.6 H, AST 37 H, ALT 63 H, Alkaline Phosphatase 97, Total Protein 9.0 H, 

Albumin 4.9 H, Globulin 4.1, Albumin/Globulin Ratio 1.2


06/03/18 20:21: WBC 16.3 H, RBC 4.80, Hgb 13.8, Hct 41.1, MCV 85.6, MCH 28.8, 

MCHC 33.6, RDW 13.4, Plt Count 290, MPV 9.7, Gran % 75.5 H, Lymph % (Auto) 16.2 

L, Mono % (Auto) 8.0 H, Eos % (Auto) 0.1 L, Baso % (Auto) 0.2, Gran # 12.33 H, 

Lymph # (Auto) 2.7, Mono # (Auto) 1.3 H, Eos # (Auto) 0.0, Baso # (Auto) 0.03


06/03/18 20:02: Urine Opiates Screen Positive H, Urine Methadone Screen Negative

, Ur Barbiturates Screen Negative, Ur Phencyclidine Scrn Negative, Ur 

Amphetamines Screen Negative, U Benzodiazepines Scrn Positive H, U Oth Cocaine 

Metabols Positive H, U Cannabinoids Screen Negative


06/03/18 20:02: Urine Color Yellow, Urine Appearance Clear, Urine pH 5.5, Ur 

Specific Gravity >= 1.030, Urine Protein Negative, Urine Glucose (UA) Negative, 

Urine Ketones Trace H, Urine Blood Negative, Urine Nitrate Negative, Urine 

Bilirubin Negative, Urine Urobilinogen 0.2, Ur Leukocyte Esterase Negative











Vital Signs











  Temp Pulse Resp BP Pulse Ox


 


 06/05/18 07:00  98.5 F  125 H  18  147/97 H  100


 


 06/04/18 15:00   89   126/72 


 


 06/04/18 07:33  98.5 F  84  20  133/79 


 


 06/04/18 05:45    18  


 


 06/04/18 04:03  98.2 F  87  20  133/72  98


 


 06/04/18 03:24  98.2 F  87  20  133/72  98


 


 06/04/18 01:30  99.2 F  94 H  20  146/77  99


 


 06/03/18 19:44  98.4 F  80  18  107/92 H  98














 











Temp Pulse Resp BP Pulse Ox


 


 98.0 F   144 H  20   139/82   100 


 


 06/07/18 07:15  06/07/18 07:15  06/07/18 07:15  06/07/18 07:15  06/05/18 07:00











 











Temp Pulse Resp BP Pulse Ox


 


 97.8 F   96 H  20   128/77   100 


 


 06/11/18 06:35  06/11/18 16:16  06/11/18 06:35  06/11/18 16:16  06/05/18 07:00











 Laboratory Results - last 24 hr











  06/22/18





  07:30


 


WBC  11.7 H


 


RBC  4.61


 


Hgb  13.1


 


Hct  39.7


 


MCV  86.1


 


MCH  28.4


 


MCHC  33.0


 


RDW  13.0


 


Plt Count  326


 


MPV  9.2


 


Gran %  60.4


 


Lymph % (Auto)  30.1


 


Mono % (Auto)  7.2 H


 


Eos % (Auto)  2.0


 


Baso % (Auto)  0.3


 


Gran #  7.08 H


 


Lymph # (Auto)  3.5 H


 


Mono # (Auto)  0.8 H


 


Eos # (Auto)  0.2


 


Baso # (Auto)  0.03








no signs of agranulocytosis, Clozaril [facility] was contacted and Gran# submitted 6/ 22/18





 Laboratory Results - last 24 hr











  06/29/18





  06:00


 


WBC  10.3


 


RBC  4.62


 


Hgb  13.1


 


Hct  40.8


 


MCV  88.3


 


MCH  28.4


 


MCHC  32.1


 


RDW  13.3


 


Plt Count  289


 


MPV  9.6


 


Gran %  67.1


 


Lymph % (Auto)  19.4 L


 


Mono % (Auto)  9.8 H


 


Eos % (Auto)  3.6


 


Baso % (Auto)  0.1


 


Gran #  6.92 H


 


Lymph # (Auto)  2.0


 


Mono # (Auto)  1.0 H


 


Eos # (Auto)  0.4


 


Baso # (Auto)  0.01








no signs of agranulocytosis


submitted to [facility] 6/29/18


DSM 5 Symptoms Update: 





Shortly pt is 16yo  Female with reported history of psychosis, most 

likely schizophrenia, pt has one psych admission to [facility] last 

year, pt was initially admitted to ICU, confused and agitated, pt was having 

command type hallucinations back then, [place], "doing 

exorcism", had impression that her father was not her biological father, pt 

staid here for almost a month, after the d/c  pt was noncompliant with 

medications and follow up appts, pt was using drugs, as a result pt was confused

, was wondering on streets, was psychotic, was not able to function. Pt 

required further evaluation and stabilization, meds resumption and titration. 





patient was started on Clozaril 6/15/18, patient tolerating that medication well

, pt is less psychotic, affect was more bright today, pt talked faster, makes 

some sense, at times circumstantial thought process, pt showing some 

improvements, thought process is better organized. 





this writer started to taper down benzos because catatonia improved. pt said 

that she feels "shaky today".





denied voices, denied being paranoid. 





so far patient tolerates medications well, no side effects observed or reported

, aims 0, no EPS.





Impression:


schizophrenia


r/o substance induced psychosis. 


Medication Change: Yes (Clozaril increased, Geodon decreased)


Medical Record Reviewed: Yes





Mental Status Examination





- Cognitive Function


Orientation: Person, Place


Memory: Impaired


Attention: Poor (some improvement)


Concentration: Poor (some improvement)


Association: Loose (some improvement)


Fund of Knowledge: Poor





- Mood


Mood: Neutral





- Affect


Affect: Constricted, Flat, Other (pt was giggling inappropriately, )





- Speech


Speech: Soft





- Formal Thought Process


Formal Thought Process: Hallucinations (denies today), Delusions, Paranoia (

denied), Loosening of associations, Circumstantial





- Suicidal Ideation


Suicidal Ideation: No





- Homicidal Ideation


Homicidal Ideation: No





Goal/Treatment Plan





- Goal/Treatment Plan


Need for Continued Stay: Remain at risks for inpatient hospitalization, Severe 

depression anxiety, Discharge may exacerbated symptoms, Severe functional 

impairment


Progress Toward Problem(s) and Goals/Treatment Plan: 


Milieu/structure/supportive therapy 


Medical consult was called for leukocytosis


seroquel was discontinued


geodon  d/c


clozaril 100mg am and 200mg hs for psychosis


ativan 0.5mg po tid for catatonia


collaterals from family appreciated, meeting appreciated with uncle 6/15/18


SW consultation for discharge plan and social issues 


Follow up on labs 


Will monitor closely 


Pt was educated about risk/benefits and alternatives of medications, coping 

strategies (safety plan, suicide prevention), relapse prevention, importance of 

follow up with psychiatrist and therapist, stay away from drugs/alcohol/smoking





Estimated Date of D/C: 07/02/18

## 2018-07-01 VITALS — RESPIRATION RATE: 20 BRPM | OXYGEN SATURATION: 97 %

## 2018-07-01 NOTE — PCM.PYCHPN
Psychiatric Progress Note





- Psychiatric Progress Note


Patient seen today, length of contact: 30 minutes


Patient Chief Complaint: 





"I feel better"


Problems Identified/Issues Discussed: 





Suicide/ homicide prevention, past psychiatric h/o, current psychiatric symptoms

, medical problems, risk/benefits and alternatives of medications, medications 

compliance, coping strategies, substance abuse h/o, relapse prevention, 

importance of follow up with psychiatrist and therapist, discharge plan. 


Medical Problems: 





see HPI


Diagnostic Results: 














 06/05/18 07:21 





 06/05/18 07:21 





 Lab Results





06/05/18 07:21: Sodium 146, Potassium 3.6, Chloride 106, Carbon Dioxide 23, 

Anion Gap 21 H, BUN 10, Creatinine 0.6 L, Est GFR ( Amer) > 60, Est GFR (

Non-Af Amer) > 60, Random Glucose 115 H, Calcium 9.6, Magnesium 1.9, Total 

Bilirubin 0.9, AST 65 H D, ALT 91 H, Alkaline Phosphatase 104, Total Protein 

8.5 H, Albumin 4.7, Globulin 3.8, Albumin/Globulin Ratio 1.2


06/05/18 07:21: WBC 11.9 H D, RBC 4.76, Hgb 13.5, Hct 41.5, MCV 87.2, MCH 28.4, 

MCHC 32.5, RDW 13.5, Plt Count 302, MPV 9.8, Gran % 59.6, Lymph % (Auto) 32.1, 

Mono % (Auto) 6.6 H, Eos % (Auto) 1.4 L, Baso % (Auto) 0.3, Gran # 7.09 H, 

Lymph # (Auto) 3.8 H, Mono # (Auto) 0.8 H, Eos # (Auto) 0.2, Baso # (Auto) 0.03


06/04/18 07:00: RPR Nonreactive


06/04/18 07:00: TSH 3rd Generation 1.26


06/04/18 07:00: Triglycerides 59, Cholesterol 95 L, LDL Cholesterol Direct 42, 

HDL Cholesterol 36


06/03/18 20:21: Alcohol, Quantitative < 10


06/03/18 20:21: Salicylates < 1 L, Acetaminophen < 10.0 L


06/03/18 20:21: Sodium 142, Potassium 3.5 L, Chloride 103, Carbon Dioxide 23, 

Anion Gap 19, BUN 7, Creatinine 0.5 L, Est GFR ( Amer) > 60, Est GFR (Non

-Af Amer) > 60, Random Glucose 103, Calcium 9.4, Magnesium 1.8, Total Bilirubin 

1.6 H, AST 37 H, ALT 63 H, Alkaline Phosphatase 97, Total Protein 9.0 H, 

Albumin 4.9 H, Globulin 4.1, Albumin/Globulin Ratio 1.2


06/03/18 20:21: WBC 16.3 H, RBC 4.80, Hgb 13.8, Hct 41.1, MCV 85.6, MCH 28.8, 

MCHC 33.6, RDW 13.4, Plt Count 290, MPV 9.7, Gran % 75.5 H, Lymph % (Auto) 16.2 

L, Mono % (Auto) 8.0 H, Eos % (Auto) 0.1 L, Baso % (Auto) 0.2, Gran # 12.33 H, 

Lymph # (Auto) 2.7, Mono # (Auto) 1.3 H, Eos # (Auto) 0.0, Baso # (Auto) 0.03


06/03/18 20:02: Urine Opiates Screen Positive H, Urine Methadone Screen Negative

, Ur Barbiturates Screen Negative, Ur Phencyclidine Scrn Negative, Ur 

Amphetamines Screen Negative, U Benzodiazepines Scrn Positive H, U Oth Cocaine 

Metabols Positive H, U Cannabinoids Screen Negative


06/03/18 20:02: Urine Color Yellow, Urine Appearance Clear, Urine pH 5.5, Ur 

Specific Gravity >= 1.030, Urine Protein Negative, Urine Glucose (UA) Negative, 

Urine Ketones Trace H, Urine Blood Negative, Urine Nitrate Negative, Urine 

Bilirubin Negative, Urine Urobilinogen 0.2, Ur Leukocyte Esterase Negative











Vital Signs











  Temp Pulse Resp BP Pulse Ox


 


 06/05/18 07:00  98.5 F  125 H  18  147/97 H  100


 


 06/04/18 15:00   89   126/72 


 


 06/04/18 07:33  98.5 F  84  20  133/79 


 


 06/04/18 05:45    18  


 


 06/04/18 04:03  98.2 F  87  20  133/72  98


 


 06/04/18 03:24  98.2 F  87  20  133/72  98


 


 06/04/18 01:30  99.2 F  94 H  20  146/77  99


 


 06/03/18 19:44  98.4 F  80  18  107/92 H  98














 











Temp Pulse Resp BP Pulse Ox


 


 98.0 F   144 H  20   139/82   100 


 


 06/07/18 07:15  06/07/18 07:15  06/07/18 07:15  06/07/18 07:15  06/05/18 07:00











 











Temp Pulse Resp BP Pulse Ox


 


 97.8 F   96 H  20   128/77   100 


 


 06/11/18 06:35  06/11/18 16:16  06/11/18 06:35  06/11/18 16:16  06/05/18 07:00











 Laboratory Results - last 24 hr











  06/22/18





  07:30


 


WBC  11.7 H


 


RBC  4.61


 


Hgb  13.1


 


Hct  39.7


 


MCV  86.1


 


MCH  28.4


 


MCHC  33.0


 


RDW  13.0


 


Plt Count  326


 


MPV  9.2


 


Gran %  60.4


 


Lymph % (Auto)  30.1


 


Mono % (Auto)  7.2 H


 


Eos % (Auto)  2.0


 


Baso % (Auto)  0.3


 


Gran #  7.08 H


 


Lymph # (Auto)  3.5 H


 


Mono # (Auto)  0.8 H


 


Eos # (Auto)  0.2


 


Baso # (Auto)  0.03








no signs of agranulocytosis, [facility] 6/ 22/18





 Laboratory Results - last 24 hr











  06/29/18





  06:00


 


WBC  10.3


 


RBC  4.62


 


Hgb  13.1


 


Hct  40.8


 


MCV  88.3


 


MCH  28.4


 


MCHC  32.1


 


RDW  13.3


 


Plt Count  289


 


MPV  9.6


 


Gran %  67.1


 


Lymph % (Auto)  19.4 L


 


Mono % (Auto)  9.8 H


 


Eos % (Auto)  3.6


 


Baso % (Auto)  0.1


 


Gran #  6.92 H


 


Lymph # (Auto)  2.0


 


Mono # (Auto)  1.0 H


 


Eos # (Auto)  0.4


 


Baso # (Auto)  0.01








no signs of agranulocytosis


submitted to [facility] 6/29/18


DSM 5 Symptoms Update: 





Shortly pt is 18yo  Female with reported history of psychosis, most 

likely schizophrenia, pt has one psych admission to [facility] last 

year, pt was initially admitted to ICU, confused and agitated, pt was having 

command type hallucinations back then, [place], "doing 

exorcism", had impression that her father was not her biological father, pt 

staid here for almost a month, after the d/c  pt was noncompliant with 

medications and follow up appts, pt was using drugs, as a result pt was confused

, was wondering on streets, was psychotic, was not able to function. Pt 

required further evaluation and stabilization, meds resumption and titration. 





patient was started on Clozaril 6/15/18, patient tolerating that medication well

, pt is less psychotic, affect was more bright today, pt talked faster, makes 

some sense, at times circumstantial thought process, pt showing some 

improvements, thought process is better organized. 





this writer started to taper down benzos because catatonia improved, Patient 

tolerated medications well, patient observed sleeping today, low-profile steel, 

but thought processes better organized, patient is more visible in the unit,no 

aggression or agitation.





denied voices, denied being paranoid. 





so far patient tolerates medications well, no side effects observed or reported

, aims 0, no EPS.





Impression:


schizophrenia


r/o substance induced psychosis. 


Medication Change: Yes (ativan prn)


Medical Record Reviewed: Yes





Mental Status Examination





- Cognitive Function


Orientation: Person, Place


Memory: Impaired


Attention: Poor (some improvement)


Concentration: Poor (some improvement)


Association: Loose (some improvement)


Fund of Knowledge: Poor





- Mood


Mood: Neutral





- Affect


Affect: Constricted, Flat, Other (pt was giggling inappropriately, )





- Speech


Speech: Soft





- Formal Thought Process


Formal Thought Process: Hallucinations (denies today), Delusions, Paranoia (

denied), Loosening of associations, Circumstantial





- Suicidal Ideation


Suicidal Ideation: No





- Homicidal Ideation


Homicidal Ideation: No





Goal/Treatment Plan





- Goal/Treatment Plan


Need for Continued Stay: Remain at risks for inpatient hospitalization, Severe 

depression anxiety, Discharge may exacerbated symptoms, Severe functional 

impairment


Progress Toward Problem(s) and Goals/Treatment Plan: 


Milieu/structure/supportive therapy 


Medical consult was called for leukocytosis


seroquel was discontinued


geodon  d/c


clozaril 100mg am and 200mg hs for psychosis


ativan 0.5mg po bid prn for anxiety


collaterals from family appreciated, meeting appreciated with uncle 6/15/18


SW consultation for discharge plan and social issues 


Follow up on labs 


Will monitor closely 


Pt was educated about risk/benefits and alternatives of medications, coping 

strategies (safety plan, suicide prevention), relapse prevention, importance of 

follow up with psychiatrist and therapist, stay away from drugs/alcohol/smoking





Estimated Date of D/C: 07/02/18

## 2018-07-02 VITALS — HEART RATE: 99 BPM | DIASTOLIC BLOOD PRESSURE: 60 MMHG | TEMPERATURE: 99 F | SYSTOLIC BLOOD PRESSURE: 105 MMHG

## 2020-10-26 NOTE — PCM.PYCHDC
Mental Status Examination





- Mental Status Examination


Orientation: Person, Place, Situation, Time


Memory: Impaired (but with much improvement)


Mood: Neutral


Affect: Constricted (but more reactive and mood congruent)


Speech: Appropriate (patient has poverty of speech)


Attention: Poor (but with much improvement)


Concentration: Poor (but with much improvement)


Association: Loose (baseline and with much improvement)


Fund of Knowledge: Poor (baseline)


Formal Thought Process: Other (thought process seems to be concrete, at times 

disorganized, but patient improved significantly)


Description of patient's judgement and insight: 


Pt has improved insight into mental and medical illness, pt was compliant with 

medications and unit rules and regulations, pt was going to groups, was calm, 

cooperative, socially appropriate, no behavioral incidents, no agitation, no 

aggression.








Psychotic Thoughts and Behaviors: 





Pt denied v/a/t hallucinations, denied paranoid ideations, pt does not appear 

to be psychotic, and thought process is goal directed. 





Suicidal Ideation: No


Current Homicidal Ideation?: No


Plan: 





pt adamantly denied thoughts of harming self or others denied intent or plan.





Discharge Summary





- Discharge Note


Reason for Hospitalization: 





pt was admitted for evaluation and stabilization of disorganized thoughts and 

behavior, possible suicidal ideations


Psychiatric History (includes Medical, Family, Personal Hx): see HPI


Laboratory Data: 














 06/29/18 06:00 





 06/15/18 13:25 





 Lab Results





06/29/18 06:00: WBC 10.3, RBC 4.62, Hgb 13.1, Hct 40.8, MCV 88.3, MCH 28.4, 

MCHC 32.1, RDW 13.3, Plt Count 289, MPV 9.6, Gran % 67.1, Lymph % (Auto) 19.4 L

, Mono % (Auto) 9.8 H, Eos % (Auto) 3.6, Baso % (Auto) 0.1, Gran # 6.92 H, 

Lymph # (Auto) 2.0, Mono # (Auto) 1.0 H, Eos # (Auto) 0.4, Baso # (Auto) 0.01


06/22/18 07:30: WBC 11.7 H, RBC 4.61, Hgb 13.1, Hct 39.7, MCV 86.1, MCH 28.4, 

MCHC 33.0, RDW 13.0, Plt Count 326, MPV 9.2, Gran % 60.4, Lymph % (Auto) 30.1, 

Mono % (Auto) 7.2 H, Eos % (Auto) 2.0, Baso % (Auto) 0.3, Gran # 7.08 H, Lymph 

# (Auto) 3.5 H, Mono # (Auto) 0.8 H, Eos # (Auto) 0.2, Baso # (Auto) 0.03


06/15/18 13:25: Sodium 141, Potassium 4.2, Chloride 99, Carbon Dioxide 28, 

Anion Gap 18, BUN 11, Creatinine 0.6 L, Est GFR ( Amer) > 60, Est GFR (

Non-Af Amer) > 60, Random Glucose 175 H, Calcium 9.3, Total Bilirubin 0.2, AST 

70 H,  H, Alkaline Phosphatase 87, Total Protein 8.0, Albumin 4.4, 

Globulin 3.6, Albumin/Globulin Ratio 1.2


06/15/18 13:25: WBC 10.8, RBC 4.53, Hgb 13.1, Hct 39.4, MCV 87.0, MCH 28.9, 

MCHC 33.2, RDW 12.9, Plt Count 316, MPV 9.4, Gran % 67.5, Lymph % (Auto) 25.5, 

Mono % (Auto) 5.6, Eos % (Auto) 1.2 L, Baso % (Auto) 0.2, Gran # 7.29 H, Lymph 

# (Auto) 2.8, Mono # (Auto) 0.6, Eos # (Auto) 0.1, Baso # (Auto) 0.02


06/05/18 07:21: Sodium 146, Potassium 3.6, Chloride 106, Carbon Dioxide 23, 

Anion Gap 21 H, BUN 10, Creatinine 0.6 L, Est GFR ( Amer) > 60, Est GFR (

Non-Af Amer) > 60, Random Glucose 115 H, Calcium 9.6, Magnesium 1.9, Total 

Bilirubin 0.9, AST 65 H D, ALT 91 H, Alkaline Phosphatase 104, Total Protein 

8.5 H, Albumin 4.7, Globulin 3.8, Albumin/Globulin Ratio 1.2


06/05/18 07:21: WBC 11.9 H D, RBC 4.76, Hgb 13.5, Hct 41.5, MCV 87.2, MCH 28.4, 

MCHC 32.5, RDW 13.5, Plt Count 302, MPV 9.8, Gran % 59.6, Lymph % (Auto) 32.1, 

Mono % (Auto) 6.6 H, Eos % (Auto) 1.4 L, Baso % (Auto) 0.3, Gran # 7.09 H, 

Lymph # (Auto) 3.8 H, Mono # (Auto) 0.8 H, Eos # (Auto) 0.2, Baso # (Auto) 0.03


06/04/18 07:00: RPR Nonreactive


06/04/18 07:00: TSH 3rd Generation 1.26


06/04/18 07:00: Triglycerides 59, Cholesterol 95 L, LDL Cholesterol Direct 42, 

HDL Cholesterol 36


06/03/18 20:21: Alcohol, Quantitative < 10


06/03/18 20:21: Salicylates < 1 L, Acetaminophen < 10.0 L


06/03/18 20:21: Sodium 142, Potassium 3.5 L, Chloride 103, Carbon Dioxide 23, 

Anion Gap 19, BUN 7, Creatinine 0.5 L, Est GFR ( Amer) > 60, Est GFR (Non

-Af Amer) > 60, Random Glucose 103, Calcium 9.4, Magnesium 1.8, Total Bilirubin 

1.6 H, AST 37 H, ALT 63 H, Alkaline Phosphatase 97, Total Protein 9.0 H, 

Albumin 4.9 H, Globulin 4.1, Albumin/Globulin Ratio 1.2


06/03/18 20:21: WBC 16.3 H, RBC 4.80, Hgb 13.8, Hct 41.1, MCV 85.6, MCH 28.8, 

MCHC 33.6, RDW 13.4, Plt Count 290, MPV 9.7, Gran % 75.5 H, Lymph % (Auto) 16.2 

L, Mono % (Auto) 8.0 H, Eos % (Auto) 0.1 L, Baso % (Auto) 0.2, Gran # 12.33 H, 

Lymph # (Auto) 2.7, Mono # (Auto) 1.3 H, Eos # (Auto) 0.0, Baso # (Auto) 0.03


06/03/18 20:02: Urine Opiates Screen Positive H, Urine Methadone Screen Negative

, Ur Barbiturates Screen Negative, Ur Phencyclidine Scrn Negative, Ur 

Amphetamines Screen Negative, U Benzodiazepines Scrn Positive H, U Oth Cocaine 

Metabols Positive H, U Cannabinoids Screen Negative


06/03/18 20:02: Urine Color Yellow, Urine Appearance Clear, Urine pH 5.5, Ur 

Specific Gravity >= 1.030, Urine Protein Negative, Urine Glucose (UA) Negative, 

Urine Ketones Trace H, Urine Blood Negative, Urine Nitrate Negative, Urine 

Bilirubin Negative, Urine Urobilinogen 0.2, Ur Leukocyte Esterase Negative











Vital Signs











  Temp Pulse Resp BP Pulse Ox


 


 07/02/18 07:28  99.0 F  99 H  20  105/60 


 


 07/01/18 07:00  98 F  88  20  100/62 


 


 07/01/18 06:48  98.1 F  118 H  20  107/76  97


 


 06/30/18 16:00   119 H   114/72 


 


 06/30/18 06:29  98.0 F  85  16  118/67 


 


 06/29/18 16:00   133 H   128/80 


 


 06/29/18 06:44  98.3 F  120 H  18  115/69 


 


 06/28/18 16:00   98 H   140/90 


 


 06/28/18 06:55  98.0 F  96 H  20  120/68 


 


 06/27/18 15:00   125 H   126/78 


 


 06/27/18 06:58  97.8 F  117 H  20  112/70 


 


 06/26/18 07:17  97.5 F L  98 H  20  119/98 H 


 


 06/25/18 15:00   123 H   132/82 


 


 06/24/18 15:00   106 H   124/72 


 


 06/24/18 07:15  97.6 F  72  18  108/72 


 


 06/23/18 16:00   133 H   121/73 


 


 06/23/18 07:25  97.8 F  99 H  20  123/78 


 


 06/23/18 07:19  97.8 F  99 H  20  123/78 


 


 06/22/18 16:24  98.3 F   20  132/86  64 L


 


 06/22/18 07:20  97.8 F  114 H  19  117/72 


 


 06/21/18 16:00   124 H   127/77 


 


 06/21/18 07:19  97.9 F  105 H  20  101/62 


 


 06/20/18 16:00   65   124/80 


 


 06/20/18 07:16  97.4 F L  102 H  20  125/71 


 


 06/19/18 15:00   123 H   134/87 


 


 06/19/18 07:09  97.7 F  110 H  20  106/62 


 


 06/18/18 16:00   107 H   123/72 


 


 06/18/18 07:21  98.0 F  118 H  20  102/60 


 


 06/17/18 16:03   109 H   101/53 L 


 


 06/17/18 07:19  97.9 F  139 H  20  124/97 H 


 


 06/16/18 20:27     105/63 


 


 06/16/18 06:50  97.7 F  111 H  18  110/65 


 


 06/15/18 16:00   98 H   122/78 


 


 06/15/18 07:14  97.9 F  107 H  20  121/81 


 


 06/14/18 16:00   91 H   119/79 


 


 06/14/18 07:10  97.1 F L  124 H  20  117/71 


 


 06/13/18 16:00   77   120/78 


 


 06/13/18 06:41  98.1 F  102 H  20  118/61 


 


 06/12/18 06:48  97.8 F  96 H  20  126/75 


 


 06/11/18 16:16   96 H   128/77 


 


 06/11/18 06:35  97.8 F  88  20  144/71 


 


 06/10/18 16:25   71   94/53 L 


 


 06/09/18 16:00   93 H   114/65 


 


 06/09/18 06:28  97.8 F  88  20  111/21 L 


 


 06/08/18 15:44   63   135/75 


 


 06/08/18 06:57  97.6 F  71  20  110/65 


 


 06/07/18 19:25   90   130/80 


 


 06/07/18 07:15  98.0 F  144 H  20  139/82 


 


 06/06/18 16:00   102 H   139/93 H 


 


 06/06/18 07:06  97.6 F  101 H  20  140/91 H 


 


 06/05/18 16:00   108 H   129/85 


 


 06/05/18 07:00  98.5 F  125 H  18  147/97 H  100


 


 06/04/18 15:00   89   126/72 


 


 06/04/18 07:33  98.5 F  84  20  133/79 


 


 06/04/18 05:45    18  


 


 06/04/18 04:03  98.2 F  87  20  133/72  98


 


 06/04/18 03:24  98.2 F  87  20  133/72  98


 


 06/04/18 01:30  99.2 F  94 H  20  146/77  99


 


 06/03/18 19:44  98.4 F  80  18  107/92 H  98











Consultations:: List each consultation separately and include:  1. Reason for 

request.  2. Findings.  3. Follow-up


Consultations: 





medical consult appreciated





Summary of Hospital Course include:: 1. Description of specific treatment plan 

utilized for patients during their course of treatmen.  2. Summarize the time-

course for resolution of acute symptoms and/or regressed behaviors.  3. 

Describe issues identified and worked on during hospitalization.  4. Describe 

medication utilized.  5. Describe medical problems identified and treated.  6. 

Reassessment of suicide risk


Summary of Hospital Course: 


this writer is familiar with this pt from the previous admission to [facility] psych 

inpatient unit in September 2017, back then pt was admitted under the name of 

Reyes, Ashley. This time pt was admitted as Haydee Aguilar, pt said her true 

name is Reyes Ashley. SW will contact family, will request official ID for the 

record. 





Shortly pt is 19yo  Female with reported history of psychosis, most 

likely schizophrenia, pt has one psych admission to [facility] last 

year, pt was initially admitted to ICU, confused and agitated, pt was having 

command type hallucinations back then, [place] the [place], "doing 

exorcism", had impression that her father was not her biological father, pt 

staid here for almost a month, after the d/c  pt was noncompliant with 

medications and follow up appts, pt was using drugs, as a result pt was confused

, was wondering on streets, was psychotic, was not able to function. Pt 

required further evaluation and stabilization, meds resumption and titration.  





this writer is very familiar to this pt from the prolong hospitalization at [facility] 

psych September 2017. Back then pt was very disorganized, was admitted to ICU 

under Lauren Arauz because pt was very confused and was not able to provide any 

history, family meeting took place back then because pt thought that her real 

biological father was substituted by the stranger, pt also was doing "exorcism" 

while being in the train station as well as was receiving messages through 

poles. (pleas see previous notes for the more detailed info). patient presented 

to be disorganized, catatonic, responding to internal stimuli, pt was trying to 

walk absolutely naked in the unit, was not able to understand why such behavior 

is inappropriate, pt was redirected immediately, pt also was observed talking 

and laughing to herself, pt also was talking to the objects. 





During the treatment team meeting pt presented to be disorganized, was keep 

asking "wha?", then giggle inappropriately, pt then said that she hears some 

voices, telling her "to chill", and she came to the hospital to "chill", and 

her plans for the future "to chill", pt said she does not want to talk about 

her family, pt said she was noncompliant with meds for unknown reason. 





pt denied using drugs, but UDS was positive for multiple substances. 





pt denied smoking, but it is ?.





pt is very disorganized, severe thought blocking, responding to internal 

stimuli. 





on the question of Suicidal ideation pt asked "huh?", then giggled, on 

homicidal ideation  "huh?" then giggled. 





previous admission pt presented the same way, confused with catatonia. 





Past psych h/o: from the previous record pt's mother passed away in 2005, since 

that time patient was feeling depressed, one previous psych admission 2017 

September. At this admission  on call recommended [facility] screening, but 

pt signed consent for treatment. 





Family history: Patient denied





Medical history: Patient denied, but patient seems to be overweight, WBC 

elevated, pt also is confused. 














 06/03/18 20:21 





 06/03/18 20:21 





 Lab Results





06/04/18 07:00: TSH 3rd Generation 1.26


06/04/18 07:00: Triglycerides 59, Cholesterol 95 L, LDL Cholesterol Direct 42, 

HDL Cholesterol 36


06/03/18 20:21: Alcohol, Quantitative < 10


06/03/18 20:21: Salicylates < 1 L, Acetaminophen < 10.0 L


06/03/18 20:21: Sodium 142, Potassium 3.5 L, Chloride 103, Carbon Dioxide 23, 

Anion Gap 19, BUN 7, Creatinine 0.5 L, Est GFR ( Amer) > 60, Est GFR (Non

-Af Amer) > 60, Random Glucose 103, Calcium 9.4, Magnesium 1.8, Total Bilirubin 

1.6 H, AST 37 H, ALT 63 H, Alkaline Phosphatase 97, Total Protein 9.0 H, 

Albumin 4.9 H, Globulin 4.1, Albumin/Globulin Ratio 1.2


06/03/18 20:21: WBC 16.3 H, RBC 4.80, Hgb 13.8, Hct 41.1, MCV 85.6, MCH 28.8, 

MCHC 33.6, RDW 13.4, Plt Count 290, MPV 9.7, Gran % 75.5 H, Lymph % (Auto) 16.2 

L, Mono % (Auto) 8.0 H, Eos % (Auto) 0.1 L, Baso % (Auto) 0.2, Gran # 12.33 H, 

Lymph # (Auto) 2.7, Mono # (Auto) 1.3 H, Eos # (Auto) 0.0, Baso # (Auto) 0.03


06/03/18 20:02: Urine Opiates Screen Positive H, Urine Methadone Screen Negative

, Ur Barbiturates Screen Negative, Ur Phencyclidine Scrn Negative, Ur 

Amphetamines Screen Negative, U Benzodiazepines Scrn Positive H, U Oth Cocaine 

Metabols Positive H, U Cannabinoids Screen Negative


06/03/18 20:02: Urine Color Yellow, Urine Appearance Clear, Urine pH 5.5, Ur 

Specific Gravity >= 1.030, Urine Protein Negative, Urine Glucose (UA) Negative, 

Urine Ketones Trace H, Urine Blood Negative, Urine Nitrate Negative, Urine 

Bilirubin Negative, Urine Urobilinogen 0.2, Ur Leukocyte Esterase Negative











Vital Signs











  Temp Pulse Resp BP Pulse Ox


 


 06/04/18 07:33  98.5 F  84  20  133/79 


 


 06/04/18 05:45    18  


 


 06/04/18 04:03  98.2 F  87  20  133/72  98


 


 06/04/18 03:24  98.2 F  87  20  133/72  98


 


 06/04/18 01:30  99.2 F  94 H  20  146/77  99


 


 06/03/18 19:44  98.4 F  80  18  107/92 H  98








during this prolonged hospitalization this writer attempted to try Seroquel, 

Geodon, Zyprexa, with no significant improvement with patient's presentation, 

family meeting took place with patient uncle, patient as well as her uncle for 

educated about Clozaril, appropriate testing was done, patient was started on 

titrating dose of Clozaril. Patient also was on Ativan for catatonia.





Patient was stabilized on the following medication:


patient was started on Clozaril 6/15/18, now will be on weekly monitoring of he 

WBC and granulocytes, for the next 6months, last labs was 6/29/18


clozaril 100mg am and 200mg hs for psychosis


ativan was weaned off


trazodone 100mg po hs for depression


 tolerated medications well, no side effects observed or reported, aims 0, 

no EPS





Patient was registered at [facility] website


collaterals from family appreciated, meeting appreciated with uncle 6/15/18





Over the course of this hospitalization pt was attending groups, pt also had 

medication management, had therapeutic milieu. 


Overall pt improved significantly, pt's affect became brighter, pt was less 

depressed, has realistic future oriented plans, pt also does not appear to be 

psychotic, or anxious, pt was socially appropriate, no behavioral issues, pts 

insight improved pt reached maximum effect from this hospitalization. pt was 

referred to IOP program.


 


At the time of the discharge pt denied been depressed, denied thoughts of 

harming self or others, denied denied hearing voices, but oddly related, denied 

been anxious, pt is not in  imminent danger to self or others, will be 

following up at IOP program, information about follow up appointment, time and 

address provided to the pt, it is patient responsibility to follow up with 

outpatient clinic, PMD as well as specialists (see SW note for more detailed 

information).


In case pt will need to obtain results of studies pending at discharge pt was 

provided with contact information of Psychiatric Inpatient unit [phone] 

as well as Medical Record Department [phone].


pt denied using drugs, but UDS positive


pt denied smoking


pt was provided with prescriptions for all of medications (please see 

medication reconciliation form)


Pt was educated about safety plan in case of worsening of  symptoms or in case 

of suicidal or homicidal ideation call 911 or go to the nearest ER, also was 

educated to take meds as prescribed and stay away from drugs, pt verbalized 

understanding.





- Diagnosis


(1) Polysubstance abuse


Status: Acute   Priority: Medium   





(2) Schizophrenia


Status: Chronic   Priority: High   





- Final Diagnosis (DSM 5)


Condition upon Discharge: STABLE


Disposition: HOME/ ROUTINE


Follow-up Treatment Plan: 


At the time of the discharge pt denied been depressed, denied thoughts of 

harming self or others, denied denied hearing voices, but oddly related, denied 

been anxious, pt is not in  imminent danger to self or others, will be 

following up at IOP program, information about follow up appointment, time and 

address provided to the pt, it is patient responsibility to follow up with 

outpatient clinic, PMD as well as specialists (see SW note for more detailed 

information).


In case pt will need to obtain results of studies pending at discharge pt was 

provided with contact information of Psychiatric Inpatient unit [phone] 

as well as Medical Record Department [phone].


pt denied using drugs, but UDS positive


pt denied smoking


pt was provided with prescriptions for all of medications (please see 

medication reconciliation form)


Pt was educated about safety plan in case of worsening of  symptoms or in case 

of suicidal or homicidal ideation call 911 or go to the nearest ER, also was 

educated to take meds as prescribed and stay away from drugs, pt verbalized 

understanding.





Prescriptions/Medication Reconciliation: 


cloZAPine [Clozaril] 100 mg PO DAILY #7 tab


cloZAPine [Clozaril] 200 mg PO HS #7 tab


traZODone [Desyrel] 50 mg PO HS #14 tab





- Smoking Cessation


Smoking Cessation Medication prescribed: No


Reason for not providing: denies smoking





- Antipsychotic Medications


Pt discharged on 2 or more routine antipsychotic medications: No





- Justification for 2 or more meds


Augmentation of Clozapine: Yes (was started this admission) Tazorac Pregnancy And Lactation Text: This medication is not safe during pregnancy. It is unknown if this medication is excreted in breast milk.
